# Patient Record
Sex: FEMALE | Race: WHITE | NOT HISPANIC OR LATINO | Employment: FULL TIME | URBAN - METROPOLITAN AREA
[De-identification: names, ages, dates, MRNs, and addresses within clinical notes are randomized per-mention and may not be internally consistent; named-entity substitution may affect disease eponyms.]

---

## 2023-08-25 ENCOUNTER — LAB (OUTPATIENT)
Dept: LAB | Facility: LAB | Age: 43
End: 2023-08-25
Payer: COMMERCIAL

## 2023-08-26 LAB — URINE CULTURE: NORMAL

## 2023-08-31 LAB — URINE CULTURE: NORMAL

## 2023-10-02 PROBLEM — D50.9 ANEMIA, IRON DEFICIENCY: Status: ACTIVE | Noted: 2023-10-02

## 2023-10-02 PROBLEM — B37.31 VAGINAL YEAST INFECTION: Status: ACTIVE | Noted: 2023-10-02

## 2023-10-02 PROBLEM — R09.81 NASAL CONGESTION: Status: ACTIVE | Noted: 2023-10-02

## 2023-10-02 PROBLEM — N89.8 VAGINAL IRRITATION: Status: ACTIVE | Noted: 2023-10-02

## 2023-10-02 PROBLEM — L65.9 ALOPECIA: Status: ACTIVE | Noted: 2023-10-02

## 2023-10-02 PROBLEM — M62.838 MUSCLE SPASM: Status: ACTIVE | Noted: 2023-10-02

## 2023-10-02 PROBLEM — H91.92 DECREASED HEARING OF LEFT EAR: Status: ACTIVE | Noted: 2023-10-02

## 2023-10-02 PROBLEM — K46.9 ABDOMINAL HERNIA: Status: ACTIVE | Noted: 2023-10-02

## 2023-10-02 PROBLEM — L21.9 SEBORRHEIC DERMATITIS, UNSPECIFIED: Status: ACTIVE | Noted: 2022-05-05

## 2023-10-02 PROBLEM — L29.9 PRURITUS: Status: ACTIVE | Noted: 2023-10-02

## 2023-10-02 PROBLEM — L30.9 DERMATITIS, UNSPECIFIED: Status: ACTIVE | Noted: 2022-05-05

## 2023-10-02 PROBLEM — L50.9 URTICARIA: Status: ACTIVE | Noted: 2022-05-05

## 2023-10-02 PROBLEM — L65.8: Status: ACTIVE | Noted: 2023-10-02

## 2023-10-02 PROBLEM — N92.3 INTERMENSTRUAL BLEEDING: Status: ACTIVE | Noted: 2023-10-02

## 2023-10-02 PROBLEM — M79.642 PAIN OF LEFT HAND: Status: ACTIVE | Noted: 2023-10-02

## 2023-10-02 PROBLEM — B96.89 BACTERIAL VAGINOSIS: Status: ACTIVE | Noted: 2023-10-02

## 2023-10-02 PROBLEM — S02.2XXA CLOSED FRACTURE OF NASAL BONES: Status: ACTIVE | Noted: 2023-10-02

## 2023-10-02 PROBLEM — N64.4 BREAST PAIN, RIGHT: Status: ACTIVE | Noted: 2023-10-02

## 2023-10-02 PROBLEM — N76.0 BACTERIAL VAGINOSIS: Status: ACTIVE | Noted: 2023-10-02

## 2023-10-02 PROBLEM — N90.89 VULVAR IRRITATION: Status: ACTIVE | Noted: 2023-10-02

## 2023-10-02 PROBLEM — R39.89 URETHRAL PAIN: Status: ACTIVE | Noted: 2023-10-02

## 2023-10-02 PROBLEM — Z04.9 CONDITION NOT FOUND: Status: ACTIVE | Noted: 2023-10-02

## 2023-10-02 PROBLEM — R39.9 UTI SYMPTOMS: Status: ACTIVE | Noted: 2023-10-02

## 2023-10-02 PROBLEM — N94.819 VULVODYNIA: Status: ACTIVE | Noted: 2023-10-02

## 2023-10-02 PROBLEM — H01.003 BLEPHARITIS OF EYELID OF RIGHT EYE: Status: ACTIVE | Noted: 2023-10-02

## 2023-10-02 PROBLEM — J34.2 DEVIATED NASAL SEPTUM: Status: ACTIVE | Noted: 2023-10-02

## 2023-10-02 PROBLEM — R21 RASH AND OTHER NONSPECIFIC SKIN ERUPTION: Status: ACTIVE | Noted: 2022-05-05

## 2023-10-02 PROBLEM — K43.2 INCISIONAL HERNIA, WITHOUT OBSTRUCTION OR GANGRENE: Status: ACTIVE | Noted: 2023-10-02

## 2023-10-02 PROBLEM — L50.3: Status: ACTIVE | Noted: 2022-05-05

## 2023-10-02 PROBLEM — R09.82 POSTNASAL DRIP: Status: ACTIVE | Noted: 2023-10-02

## 2023-10-02 PROBLEM — L66.10 LICHEN PLANOPILARIS: Status: ACTIVE | Noted: 2022-05-05

## 2023-10-02 PROBLEM — N39.0 BACTERIAL UTI: Status: ACTIVE | Noted: 2023-10-02

## 2023-10-02 PROBLEM — N94.89 VAGINAL BURNING: Status: ACTIVE | Noted: 2023-10-02

## 2023-10-02 PROBLEM — R87.612 LOW GRADE SQUAMOUS INTRAEPITHELIAL LESION (LGSIL) ON CERVICAL PAP SMEAR: Status: ACTIVE | Noted: 2023-10-02

## 2023-10-02 PROBLEM — A49.9 BACTERIAL UTI: Status: ACTIVE | Noted: 2023-10-02

## 2023-10-02 PROBLEM — K58.9 IBS (IRRITABLE BOWEL SYNDROME): Status: ACTIVE | Noted: 2023-10-02

## 2023-10-02 PROBLEM — R10.9 ABDOMINAL PAIN: Status: ACTIVE | Noted: 2023-10-02

## 2023-10-02 PROBLEM — R10.2 PELVIC PAIN: Status: ACTIVE | Noted: 2023-10-02

## 2023-10-02 PROBLEM — N94.9 VAGINAL BURNING: Status: ACTIVE | Noted: 2023-10-02

## 2023-10-02 PROBLEM — H53.8 BLURRY VISION: Status: ACTIVE | Noted: 2023-10-02

## 2023-10-02 PROBLEM — K21.9 GERD (GASTROESOPHAGEAL REFLUX DISEASE): Status: ACTIVE | Noted: 2023-10-02

## 2023-10-02 PROBLEM — R61 NIGHT SWEATS: Status: ACTIVE | Noted: 2023-10-02

## 2023-10-02 PROBLEM — R30.0 DYSURIA: Status: ACTIVE | Noted: 2023-10-02

## 2023-10-02 PROBLEM — H01.006 BLEPHARITIS OF EYELID OF LEFT EYE: Status: ACTIVE | Noted: 2023-10-02

## 2023-10-02 PROBLEM — R14.0 ABDOMINAL BLOATING: Status: ACTIVE | Noted: 2023-10-02

## 2023-10-02 PROBLEM — D72.10 MARKED EOSINOPHILIA: Status: ACTIVE | Noted: 2023-10-02

## 2023-10-02 PROBLEM — R00.2 PALPITATIONS: Status: ACTIVE | Noted: 2023-10-02

## 2023-10-02 PROBLEM — N94.89 VULVAR BURNING: Status: ACTIVE | Noted: 2023-10-02

## 2023-10-02 PROBLEM — D72.819 LEUKOPENIA: Status: ACTIVE | Noted: 2023-10-02

## 2023-10-02 PROBLEM — J30.9 ALLERGIC RHINITIS: Status: ACTIVE | Noted: 2023-10-02

## 2023-10-02 PROBLEM — F41.9 ANXIETY: Status: ACTIVE | Noted: 2023-10-02

## 2023-10-02 PROBLEM — L66.1 LICHEN PLANOPILARIS: Status: ACTIVE | Noted: 2022-05-05

## 2023-10-02 PROBLEM — N94.10 DYSPAREUNIA, FEMALE: Status: ACTIVE | Noted: 2023-10-02

## 2023-10-02 PROBLEM — B37.31 CANDIDIASIS OF VAGINA: Status: ACTIVE | Noted: 2023-10-02

## 2023-10-02 PROBLEM — R39.15 URINARY URGENCY: Status: ACTIVE | Noted: 2023-10-02

## 2023-10-02 PROBLEM — J34.3 HYPERTROPHY OF BOTH INFERIOR NASAL TURBINATES: Status: ACTIVE | Noted: 2023-10-02

## 2023-10-02 RX ORDER — ESTRADIOL 0.5 MG/1
TABLET ORAL
COMMUNITY
Start: 2022-11-29 | End: 2023-10-29 | Stop reason: ALTCHOICE

## 2023-10-02 RX ORDER — ONDANSETRON HYDROCHLORIDE 8 MG/1
8 TABLET, FILM COATED ORAL EVERY 8 HOURS PRN
COMMUNITY
Start: 2022-10-10 | End: 2023-10-29 | Stop reason: ALTCHOICE

## 2023-10-02 RX ORDER — ESTRADIOL MICRONIZED 100 %
POWDER (GRAM) MISCELLANEOUS
COMMUNITY
Start: 2023-05-01

## 2023-10-02 RX ORDER — MULTIVIT-MIN/IRON/FOLIC ACID/K 18-600-40
2 CAPSULE ORAL DAILY
COMMUNITY

## 2023-10-02 RX ORDER — OLOPATADINE HYDROCHLORIDE 1 MG/ML
1 SOLUTION/ DROPS OPHTHALMIC 2 TIMES DAILY
COMMUNITY
Start: 2019-08-15

## 2023-10-02 RX ORDER — BIOTIN 10 MG
1 TABLET ORAL DAILY
COMMUNITY
End: 2023-12-11 | Stop reason: ALTCHOICE

## 2023-10-02 RX ORDER — TACROLIMUS 1 MG/G
1 OINTMENT TOPICAL
COMMUNITY
Start: 2017-05-04 | End: 2023-10-29 | Stop reason: ALTCHOICE

## 2023-10-02 RX ORDER — FLUCONAZOLE 150 MG/1
TABLET ORAL
COMMUNITY
Start: 2022-10-11 | End: 2023-10-29 | Stop reason: ALTCHOICE

## 2023-10-02 RX ORDER — VEHICLE BASE NO.24
CREAM (GRAM) MISCELLANEOUS
COMMUNITY
Start: 2023-03-14 | End: 2023-10-29 | Stop reason: ALTCHOICE

## 2023-10-02 RX ORDER — DOXYCYCLINE HYCLATE 100 MG
100 TABLET ORAL 2 TIMES DAILY
COMMUNITY
Start: 2022-10-10 | End: 2023-12-11 | Stop reason: ALTCHOICE

## 2023-10-02 RX ORDER — FLUOCINONIDE 0.5 MG/G
1 CREAM TOPICAL
COMMUNITY
Start: 2020-03-24 | End: 2023-10-29 | Stop reason: ALTCHOICE

## 2023-10-04 ENCOUNTER — OFFICE VISIT (OUTPATIENT)
Dept: ALLERGY | Facility: HOSPITAL | Age: 43
End: 2023-10-04
Payer: COMMERCIAL

## 2023-10-04 DIAGNOSIS — L50.8 RECURRENT URTICARIA: Primary | ICD-10-CM

## 2023-10-04 DIAGNOSIS — L50.9 URTICARIA: ICD-10-CM

## 2023-10-04 PROCEDURE — 99442 PR PHYS/QHP TELEPHONE EVALUATION 11-20 MIN: CPT | Performed by: ALLERGY & IMMUNOLOGY

## 2023-10-04 RX ORDER — LEVOCETIRIZINE DIHYDROCHLORIDE 5 MG/1
5 TABLET, FILM COATED ORAL EVERY EVENING
Qty: 90 TABLET | Refills: 3 | Status: SHIPPED | OUTPATIENT
Start: 2023-10-04 | End: 2023-10-05 | Stop reason: SDUPTHER

## 2023-10-04 NOTE — PROGRESS NOTES
Subjective     Patient ID: Roselyn Pacheco is a 43 y.o. female who presents for follow up of urticaria and pruritus    HPI  Plan last visit:  continue levocetirzine daily at same dose with trial down by 50% after surgery   if 50% wean tolerated x 1 month, then discontinue levocetirizine   future penicillin testing in the office OFF of oral antihistamine x 7 days   cleared for NSAIDS from allergy standpoint, but NSAIDS can continue to flare urticaria by their mechanism of mast cell degranulation    Interval:  Hives: none  Itching: trialed off of levocetirizine and within 2 days itching, no hives   Well controlled on the medication  Meds: levocetirizine 5mg daily   Review of Systems  Pertinent positives and negatives have been assessed in the HPI.  All others systems have been reviewed and are negative for complaint.      Objective   Physical Exam  No physical exam this was a phone encounter      Assessment/Plan   Problem List Items Addressed This Visit       Urticaria - Primary   This visit was completed via telephone.  All issues as below were discussed and addressed but no physical exam was performed. If it was felt that the patient should be evaluated in clinic then they were directed there. The patient verbally consented to visit.  I spent 10 minutes with patient and/or family on the phone and more than 50% of the time was spent counseling and/or coordinating care.       Roselyn Pacheco is a 43 year old woman with chronic spontaneous urticaria overall well controlled on low dose levocetirizine 5 mg daily who has been in remission for years, with breakthrough pruritus that is infrequent with infection as the most common trigger. She had mild transient eosinophilia with normal AEC on repeat associated with gastroenteritis.   Will continue levocetirizine 5 mg daily   penicillin testing pending based on rash as a child--will test if oral antihistamine wean tolerated  aspirin reaction: rash as a child, and tolerance of  NSAIDs (advil)--can avoid ASA, but qualifes for a challenge in office based on tolerance of NSAIDs  --------------------------------  historically:  previous patient of Dr. Wills autoimmune urticaria started 2019, responsive to levocetirizine 5 mg daily due for titration down to assess effect  previous labs reassuring, no evidence of IgE mediated allergies on skin testing with Dr. Wills  concominant concerns of alopecia following with dermatology completing light therapy, considering rogaine, ridged nails, and fatigue, autoimmune labs ordered by PMD all normal, MUKESH neg

## 2023-10-04 NOTE — PATIENT INSTRUCTIONS
Recommendations:    Continue levocetirizine 5 mg daily       Follow up in 1 year  It was a pleasure to see you in clinic today  Call our Nurse Line with questions: [phone]    Call our  for visit follow up [phone]

## 2023-10-05 DIAGNOSIS — L50.8 RECURRENT URTICARIA: ICD-10-CM

## 2023-10-06 ENCOUNTER — PHARMACY VISIT (OUTPATIENT)
Dept: PHARMACY | Facility: CLINIC | Age: 43
End: 2023-10-06
Payer: COMMERCIAL

## 2023-10-06 PROCEDURE — RXMED WILLOW AMBULATORY MEDICATION CHARGE

## 2023-10-06 RX ORDER — LEVOCETIRIZINE DIHYDROCHLORIDE 5 MG/1
5 TABLET, FILM COATED ORAL EVERY EVENING
Qty: 90 TABLET | Refills: 0 | Status: SHIPPED | OUTPATIENT
Start: 2023-10-06 | End: 2024-01-04 | Stop reason: SDUPTHER

## 2023-10-11 ENCOUNTER — TELEPHONE (OUTPATIENT)
Dept: ALLERGY | Facility: CLINIC | Age: 43
End: 2023-10-11

## 2023-10-11 ENCOUNTER — TELEPHONE (OUTPATIENT)
Dept: ALLERGY | Facility: HOSPITAL | Age: 43
End: 2023-10-11

## 2023-10-29 ENCOUNTER — APPOINTMENT (OUTPATIENT)
Dept: PRIMARY CARE | Facility: CLINIC | Age: 43
End: 2023-10-29
Payer: COMMERCIAL

## 2023-10-29 DIAGNOSIS — J01.90 ACUTE NON-RECURRENT SINUSITIS, UNSPECIFIED LOCATION: Primary | ICD-10-CM

## 2023-10-29 RX ORDER — AZITHROMYCIN 250 MG/1
TABLET, FILM COATED ORAL
Qty: 6 TABLET | Refills: 0 | Status: SHIPPED | OUTPATIENT
Start: 2023-10-29 | End: 2023-11-08

## 2023-10-29 RX ORDER — FLUCONAZOLE 150 MG/1
150 TABLET ORAL SEE ADMIN INSTRUCTIONS
Qty: 2 TABLET | Refills: 0 | Status: SHIPPED | OUTPATIENT
Start: 2023-10-29 | End: 2023-11-10

## 2023-10-29 RX ORDER — CLINDAMYCIN HYDROCHLORIDE 300 MG/1
CAPSULE ORAL
COMMUNITY
Start: 2023-10-17 | End: 2023-12-11 | Stop reason: ALTCHOICE

## 2023-10-29 ASSESSMENT — LIFESTYLE VARIABLES: HISTORY_OF_SMOKING: I HAVE NEVER SMOKED

## 2023-10-29 NOTE — PROGRESS NOTES
Cold 1.5 weeks ago-- 5-6 days total  Congestion was better until today it is worse  Everyone around her has been sick--  Pain and pressure in sinuses today--  No fever chills aches  Earlier today hot and cold though  No V,D  Belly upset today  No cough today or SOB or wheezing  Almost feels like it is something new now-- all in head and neck feels bad  PND and ST this am on the one side  No LAD in neck  Pain in maxillary sinuses  Appetite-minimal since cold  Fluids--good  OTC--nasal saline rinse-     New viral vs sinjsitis---zpak --doxy rips up stomach

## 2023-10-30 ENCOUNTER — PHARMACY VISIT (OUTPATIENT)
Dept: PHARMACY | Facility: CLINIC | Age: 43
End: 2023-10-30
Payer: COMMERCIAL

## 2023-10-30 PROCEDURE — RXMED WILLOW AMBULATORY MEDICATION CHARGE

## 2023-12-11 ENCOUNTER — OFFICE VISIT (OUTPATIENT)
Dept: PRIMARY CARE | Facility: CLINIC | Age: 43
End: 2023-12-11
Payer: COMMERCIAL

## 2023-12-11 VITALS
OXYGEN SATURATION: 99 % | DIASTOLIC BLOOD PRESSURE: 62 MMHG | HEART RATE: 78 BPM | BODY MASS INDEX: 20.6 KG/M2 | WEIGHT: 120 LBS | SYSTOLIC BLOOD PRESSURE: 116 MMHG | RESPIRATION RATE: 18 BRPM

## 2023-12-11 DIAGNOSIS — Z12.31 VISIT FOR SCREENING MAMMOGRAM: ICD-10-CM

## 2023-12-11 DIAGNOSIS — F41.9 ANXIETY: ICD-10-CM

## 2023-12-11 DIAGNOSIS — E55.9 VITAMIN D DEFICIENCY: ICD-10-CM

## 2023-12-11 DIAGNOSIS — Z00.00 ENCOUNTER FOR ANNUAL PHYSICAL EXAM: Primary | ICD-10-CM

## 2023-12-11 DIAGNOSIS — F41.9 ANXIETY: Primary | ICD-10-CM

## 2023-12-11 PROBLEM — M79.642 PAIN OF LEFT HAND: Status: RESOLVED | Noted: 2023-10-02 | Resolved: 2023-12-11

## 2023-12-11 PROBLEM — N94.9 VAGINAL BURNING: Status: RESOLVED | Noted: 2023-10-02 | Resolved: 2023-12-11

## 2023-12-11 PROBLEM — N89.8 VAGINAL IRRITATION: Status: RESOLVED | Noted: 2023-10-02 | Resolved: 2023-12-11

## 2023-12-11 PROBLEM — R14.0 ABDOMINAL BLOATING: Status: RESOLVED | Noted: 2023-10-02 | Resolved: 2023-12-11

## 2023-12-11 PROBLEM — N64.4 BREAST PAIN, RIGHT: Status: RESOLVED | Noted: 2023-10-02 | Resolved: 2023-12-11

## 2023-12-11 PROBLEM — R39.15 URINARY URGENCY: Status: RESOLVED | Noted: 2023-10-02 | Resolved: 2023-12-11

## 2023-12-11 PROBLEM — B96.89 BACTERIAL VAGINOSIS: Status: RESOLVED | Noted: 2023-10-02 | Resolved: 2023-12-11

## 2023-12-11 PROBLEM — R21 RASH AND OTHER NONSPECIFIC SKIN ERUPTION: Status: RESOLVED | Noted: 2022-05-05 | Resolved: 2023-12-11

## 2023-12-11 PROBLEM — K46.9 ABDOMINAL HERNIA: Status: RESOLVED | Noted: 2023-10-02 | Resolved: 2023-12-11

## 2023-12-11 PROBLEM — R39.9 UTI SYMPTOMS: Status: RESOLVED | Noted: 2023-10-02 | Resolved: 2023-12-11

## 2023-12-11 PROBLEM — L21.9 SEBORRHEIC DERMATITIS, UNSPECIFIED: Status: RESOLVED | Noted: 2022-05-05 | Resolved: 2023-12-11

## 2023-12-11 PROBLEM — B37.31 CANDIDIASIS OF VAGINA: Status: RESOLVED | Noted: 2023-10-02 | Resolved: 2023-12-11

## 2023-12-11 PROBLEM — R00.2 PALPITATIONS: Status: RESOLVED | Noted: 2023-10-02 | Resolved: 2023-12-11

## 2023-12-11 PROBLEM — L50.9 URTICARIA: Status: RESOLVED | Noted: 2022-05-05 | Resolved: 2023-12-11

## 2023-12-11 PROBLEM — N90.89 VULVAR IRRITATION: Status: RESOLVED | Noted: 2023-10-02 | Resolved: 2023-12-11

## 2023-12-11 PROBLEM — N94.10 DYSPAREUNIA, FEMALE: Status: RESOLVED | Noted: 2023-10-02 | Resolved: 2023-12-11

## 2023-12-11 PROBLEM — M62.838 MUSCLE SPASM: Status: RESOLVED | Noted: 2023-10-02 | Resolved: 2023-12-11

## 2023-12-11 PROBLEM — N76.0 BACTERIAL VAGINOSIS: Status: RESOLVED | Noted: 2023-10-02 | Resolved: 2023-12-11

## 2023-12-11 PROBLEM — L65.8: Status: RESOLVED | Noted: 2023-10-02 | Resolved: 2023-12-11

## 2023-12-11 PROBLEM — A49.9 BACTERIAL UTI: Status: RESOLVED | Noted: 2023-10-02 | Resolved: 2023-12-11

## 2023-12-11 PROBLEM — R10.9 ABDOMINAL PAIN: Status: RESOLVED | Noted: 2023-10-02 | Resolved: 2023-12-11

## 2023-12-11 PROBLEM — R87.612 LOW GRADE SQUAMOUS INTRAEPITHELIAL LESION (LGSIL) ON CERVICAL PAP SMEAR: Status: RESOLVED | Noted: 2023-10-02 | Resolved: 2023-12-11

## 2023-12-11 PROBLEM — H91.92 DECREASED HEARING OF LEFT EAR: Status: RESOLVED | Noted: 2023-10-02 | Resolved: 2023-12-11

## 2023-12-11 PROBLEM — R61 NIGHT SWEATS: Status: RESOLVED | Noted: 2023-10-02 | Resolved: 2023-12-11

## 2023-12-11 PROBLEM — N94.89 VULVAR BURNING: Status: RESOLVED | Noted: 2023-10-02 | Resolved: 2023-12-11

## 2023-12-11 PROBLEM — L50.8 RECURRENT URTICARIA: Status: RESOLVED | Noted: 2023-10-04 | Resolved: 2023-12-11

## 2023-12-11 PROBLEM — Z04.9 CONDITION NOT FOUND: Status: RESOLVED | Noted: 2023-10-02 | Resolved: 2023-12-11

## 2023-12-11 PROBLEM — L29.9 PRURITUS: Status: RESOLVED | Noted: 2023-10-02 | Resolved: 2023-12-11

## 2023-12-11 PROBLEM — H53.8 BLURRY VISION: Status: RESOLVED | Noted: 2023-10-02 | Resolved: 2023-12-11

## 2023-12-11 PROBLEM — N92.3 INTERMENSTRUAL BLEEDING: Status: RESOLVED | Noted: 2023-10-02 | Resolved: 2023-12-11

## 2023-12-11 PROBLEM — N94.89 VAGINAL BURNING: Status: RESOLVED | Noted: 2023-10-02 | Resolved: 2023-12-11

## 2023-12-11 PROBLEM — R30.0 DYSURIA: Status: RESOLVED | Noted: 2023-10-02 | Resolved: 2023-12-11

## 2023-12-11 PROBLEM — L50.3: Status: RESOLVED | Noted: 2022-05-05 | Resolved: 2023-12-11

## 2023-12-11 PROBLEM — K43.2 INCISIONAL HERNIA, WITHOUT OBSTRUCTION OR GANGRENE: Status: RESOLVED | Noted: 2023-10-02 | Resolved: 2023-12-11

## 2023-12-11 PROBLEM — N39.0 BACTERIAL UTI: Status: RESOLVED | Noted: 2023-10-02 | Resolved: 2023-12-11

## 2023-12-11 PROBLEM — R09.81 NASAL CONGESTION: Status: RESOLVED | Noted: 2023-10-02 | Resolved: 2023-12-11

## 2023-12-11 PROBLEM — S02.2XXA CLOSED FRACTURE OF NASAL BONES: Status: RESOLVED | Noted: 2023-10-02 | Resolved: 2023-12-11

## 2023-12-11 PROBLEM — R09.82 POSTNASAL DRIP: Status: RESOLVED | Noted: 2023-10-02 | Resolved: 2023-12-11

## 2023-12-11 PROBLEM — B37.31 VAGINAL YEAST INFECTION: Status: RESOLVED | Noted: 2023-10-02 | Resolved: 2023-12-11

## 2023-12-11 PROBLEM — J34.2 DEVIATED NASAL SEPTUM: Status: RESOLVED | Noted: 2023-10-02 | Resolved: 2023-12-11

## 2023-12-11 PROBLEM — R39.89 URETHRAL PAIN: Status: RESOLVED | Noted: 2023-10-02 | Resolved: 2023-12-11

## 2023-12-11 PROBLEM — R10.2 PELVIC PAIN: Status: RESOLVED | Noted: 2023-10-02 | Resolved: 2023-12-11

## 2023-12-11 PROCEDURE — 99396 PREV VISIT EST AGE 40-64: CPT | Performed by: INTERNAL MEDICINE

## 2023-12-11 PROCEDURE — 1036F TOBACCO NON-USER: CPT | Performed by: INTERNAL MEDICINE

## 2023-12-11 RX ORDER — HYDROXYZINE HYDROCHLORIDE 10 MG/1
10 TABLET, FILM COATED ORAL 2 TIMES DAILY PRN
Qty: 90 TABLET | Refills: 0 | Status: SHIPPED | OUTPATIENT
Start: 2023-12-11 | End: 2024-01-25

## 2023-12-11 RX ORDER — GLYCERIN 100 %
LIQUID (ML) MISCELLANEOUS
COMMUNITY
Start: 2023-11-16

## 2023-12-11 ASSESSMENT — ENCOUNTER SYMPTOMS
DEPRESSION: 0
OCCASIONAL FEELINGS OF UNSTEADINESS: 0
LOSS OF SENSATION IN FEET: 0

## 2023-12-12 NOTE — ASSESSMENT & PLAN NOTE
Not willing to take anxiolytic medications.  Advised to see psychologist in consult.  Try hydroxyzine 10 mg twice a day as needed.

## 2023-12-12 NOTE — ASSESSMENT & PLAN NOTE
Received influenza vaccine this year.  Have screening mammogram.  Recommend to see gynecologist for screening Pap test.  Have fasting blood work.

## 2023-12-12 NOTE — PROGRESS NOTES
Subjective   Patient ID: Roselyn Pacheco is a 43 y.o. female who presents for Annual Exam.    Annual physical exam.  C/O anxiety, gastrointestinal issues diarrhea constipation.  Concern about coronary artery disease, her mother has high coronary calcium score atrial fibrillation.  Her last fasting lipid profile was within normal limits.         Review of Systems   Psychiatric/Behavioral:          Anxiety   All other systems reviewed and are negative.      Objective   /62 (BP Location: Right arm, Patient Position: Sitting)   Pulse 78   Resp 18   Wt 54.4 kg (120 lb)   SpO2 99%   BMI 20.60 kg/m²     Physical Exam  Constitutional:       Appearance: Normal appearance.   HENT:      Head: Normocephalic and atraumatic.      Right Ear: External ear normal.      Left Ear: External ear normal.      Mouth/Throat:      Mouth: Mucous membranes are moist.      Pharynx: Oropharynx is clear.   Neck:      Vascular: No carotid bruit.   Cardiovascular:      Rate and Rhythm: Normal rate and regular rhythm.      Heart sounds: No murmur heard.     No gallop.   Pulmonary:      Effort: Pulmonary effort is normal. No respiratory distress.      Breath sounds: No wheezing or rales.   Chest:      Comments: Breasts implants.  Abdominal:      General: Abdomen is flat.      Palpations: Abdomen is soft.      Hernia: No hernia is present.   Musculoskeletal:         General: No swelling, tenderness, deformity or signs of injury.      Cervical back: No rigidity or tenderness.      Right lower leg: No edema.   Lymphadenopathy:      Cervical: No cervical adenopathy.   Skin:     Coloration: Skin is not jaundiced or pale.      Findings: No bruising, erythema, lesion or rash.   Neurological:      General: No focal deficit present.      Mental Status: She is oriented to person, place, and time.      Motor: No weakness.      Coordination: Coordination normal.   Psychiatric:         Mood and Affect: Mood normal.         Behavior: Behavior normal.          Assessment/Plan   Problem List Items Addressed This Visit             ICD-10-CM    Anxiety F41.9     Not willing to take anxiolytic medications.  Advised to see psychologist in consult.  Try hydroxyzine 10 mg twice a day as needed.         Encounter for annual physical exam - Primary Z00.00     Received influenza vaccine this year.  Have screening mammogram.  Recommend to see gynecologist for screening Pap test.  Have fasting blood work.         Relevant Orders    CBC and Auto Differential    Comprehensive Metabolic Panel    Lipid Panel     Other Visit Diagnoses         Codes    Vitamin D deficiency     E55.9    Relevant Orders    Vitamin D 25-Hydroxy,Total (for eval of Vitamin D levels)    Visit for screening mammogram     Z12.31    Relevant Orders    BI mammo bilateral screening tomosynthesis

## 2024-01-04 DIAGNOSIS — L50.8 RECURRENT URTICARIA: ICD-10-CM

## 2024-01-05 RX ORDER — LEVOCETIRIZINE DIHYDROCHLORIDE 5 MG/1
5 TABLET, FILM COATED ORAL EVERY EVENING
Qty: 90 TABLET | Refills: 0 | Status: SHIPPED | OUTPATIENT
Start: 2024-01-05 | End: 2024-04-05 | Stop reason: SDUPTHER

## 2024-01-10 ENCOUNTER — PHARMACY VISIT (OUTPATIENT)
Dept: PHARMACY | Facility: CLINIC | Age: 44
End: 2024-01-10

## 2024-01-10 PROCEDURE — RXMED WILLOW AMBULATORY MEDICATION CHARGE

## 2024-04-04 ENCOUNTER — TELEPHONE (OUTPATIENT)
Dept: OBSTETRICS AND GYNECOLOGY | Facility: CLINIC | Age: 44
End: 2024-04-04

## 2024-04-04 ENCOUNTER — APPOINTMENT (OUTPATIENT)
Dept: PRIMARY CARE | Facility: CLINIC | Age: 44
End: 2024-04-04
Payer: COMMERCIAL

## 2024-04-04 ENCOUNTER — DOCUMENTATION (OUTPATIENT)
Dept: OBSTETRICS AND GYNECOLOGY | Facility: CLINIC | Age: 44
End: 2024-04-04

## 2024-04-04 ENCOUNTER — LAB (OUTPATIENT)
Dept: LAB | Facility: LAB | Age: 44
End: 2024-04-04
Payer: COMMERCIAL

## 2024-04-04 DIAGNOSIS — R35.0 URINE FREQUENCY: Primary | ICD-10-CM

## 2024-04-04 DIAGNOSIS — R35.0 URINE FREQUENCY: ICD-10-CM

## 2024-04-04 LAB
APPEARANCE UR: CLEAR
BILIRUB UR STRIP.AUTO-MCNC: NEGATIVE MG/DL
COLOR UR: COLORLESS
GLUCOSE UR STRIP.AUTO-MCNC: NORMAL MG/DL
KETONES UR STRIP.AUTO-MCNC: NEGATIVE MG/DL
LEUKOCYTE ESTERASE UR QL STRIP.AUTO: NEGATIVE
NITRITE UR QL STRIP.AUTO: NEGATIVE
PH UR STRIP.AUTO: 5.5 [PH]
PROT UR STRIP.AUTO-MCNC: NEGATIVE MG/DL
RBC # UR STRIP.AUTO: NEGATIVE /UL
SP GR UR STRIP.AUTO: 1
UROBILINOGEN UR STRIP.AUTO-MCNC: NORMAL MG/DL

## 2024-04-04 PROCEDURE — 81003 URINALYSIS AUTO W/O SCOPE: CPT

## 2024-04-04 PROCEDURE — 87086 URINE CULTURE/COLONY COUNT: CPT

## 2024-04-04 NOTE — TELEPHONE ENCOUNTER
Pt verified by name and .  Pt is aware Dr. Juares put in order for urinalysis and urine culture.

## 2024-04-04 NOTE — TELEPHONE ENCOUNTER
Pt verified by name and .  Pt calling for req for urinalysis.  Pt states she has had burning when urinating, urgegency, frequency since Monday.  Pt denies fever no flank pain.  Pt states she tried AZO last night.  Pt is awrae nurse will send message to Dr. Juares.  Pt's last appt with Elias Diaz 2022.  Pt's last thuy with Dr. Juares .  Pt has upcoming appt with Dr. Juares 2024.  Pt states her allergies are PCN and Aspirin.

## 2024-04-05 ENCOUNTER — TELEPHONE (OUTPATIENT)
Dept: OBSTETRICS AND GYNECOLOGY | Facility: CLINIC | Age: 44
End: 2024-04-05
Payer: COMMERCIAL

## 2024-04-05 ENCOUNTER — PHARMACY VISIT (OUTPATIENT)
Dept: PHARMACY | Facility: CLINIC | Age: 44
End: 2024-04-05
Payer: COMMERCIAL

## 2024-04-05 DIAGNOSIS — N39.0 URINARY TRACT INFECTION WITHOUT HEMATURIA, SITE UNSPECIFIED: ICD-10-CM

## 2024-04-05 DIAGNOSIS — L50.8 RECURRENT URTICARIA: ICD-10-CM

## 2024-04-05 LAB — HOLD SPECIMEN: NORMAL

## 2024-04-05 PROCEDURE — RXMED WILLOW AMBULATORY MEDICATION CHARGE

## 2024-04-05 RX ORDER — NITROFURANTOIN 25; 75 MG/1; MG/1
100 CAPSULE ORAL 2 TIMES DAILY
Qty: 10 CAPSULE | Refills: 0 | Status: SHIPPED | OUTPATIENT
Start: 2024-04-05 | End: 2024-04-10

## 2024-04-05 NOTE — TELEPHONE ENCOUNTER
Pt verified by name and .  Pt calling regarding marcrobid.  Pt is aware message has been sent to Dr. Figueroa to verify rx and add urine culture to her specimen at lab.  Pt states she toth snot want to take medication if she does not need it.  Pt is aware nurse  has not heard from Dr. Juares.  Pt states she will message Dr. Juares.

## 2024-04-05 NOTE — TELEPHONE ENCOUNTER
Left message for pt to return call:  MD Mindy Lares, RN  UA and urine culture. Start Macrobid 100 mg BID for 5 days          Nurse called  Lab  [phone] on 2024 at 0905.  Spoke to Kristi  Verified pt by name and .  Per Kristi an order for urine culture needs to be added on.  She is aware there is an order for urine culture in computer.

## 2024-04-05 NOTE — PROGRESS NOTES
Pt returned call.  Pt verified by name and .  Pt is aware nurse will send message to Dr. Juares to  order Macrobid 100 mg BID for 5 days per Dr. Juares.  Pt has no questions at this time.

## 2024-04-06 LAB — BACTERIA UR CULT: NO GROWTH

## 2024-04-08 ENCOUNTER — TELEPHONE (OUTPATIENT)
Dept: OBSTETRICS AND GYNECOLOGY | Facility: CLINIC | Age: 44
End: 2024-04-08
Payer: COMMERCIAL

## 2024-04-08 NOTE — TELEPHONE ENCOUNTER
Pt verified by name and .  Pt is aware of Dr. Juares's message:  Negative culture. No obvious source of her symptoms.   Pt has no questions at this time.

## 2024-04-10 RX ORDER — LEVOCETIRIZINE DIHYDROCHLORIDE 5 MG/1
5 TABLET, FILM COATED ORAL EVERY EVENING
Qty: 90 TABLET | Refills: 0 | Status: SHIPPED | OUTPATIENT
Start: 2024-04-10 | End: 2024-07-11

## 2024-04-11 ENCOUNTER — TELEPHONE (OUTPATIENT)
Dept: OBSTETRICS AND GYNECOLOGY | Facility: CLINIC | Age: 44
End: 2024-04-11
Payer: COMMERCIAL

## 2024-04-11 PROCEDURE — RXMED WILLOW AMBULATORY MEDICATION CHARGE

## 2024-04-12 ENCOUNTER — PHARMACY VISIT (OUTPATIENT)
Dept: PHARMACY | Facility: CLINIC | Age: 44
End: 2024-04-12
Payer: COMMERCIAL

## 2024-04-12 NOTE — TELEPHONE ENCOUNTER
Nurse called [facility]  Spoke to Wendy Lynne pt by name and .  Ordered per Elias Diaz:  0.03% estradiol with 0.1% testosterone in verabase.  Pt to apply 0.25 ml every third day.  45 ml with one refill.  Nurse send my chart message to pt that pharmacy will be reaching out to her.

## 2024-05-03 ENCOUNTER — LAB (OUTPATIENT)
Dept: LAB | Facility: LAB | Age: 44
End: 2024-05-03
Payer: COMMERCIAL

## 2024-05-03 DIAGNOSIS — Z00.00 ENCOUNTER FOR ANNUAL PHYSICAL EXAM: ICD-10-CM

## 2024-05-03 DIAGNOSIS — E55.9 VITAMIN D DEFICIENCY: ICD-10-CM

## 2024-05-03 LAB
25(OH)D3 SERPL-MCNC: 90 NG/ML (ref 30–100)
ALBUMIN SERPL BCP-MCNC: 4.3 G/DL (ref 3.4–5)
ALP SERPL-CCNC: 54 U/L (ref 33–110)
ALT SERPL W P-5'-P-CCNC: 15 U/L (ref 7–45)
ANION GAP SERPL CALC-SCNC: 13 MMOL/L (ref 10–20)
AST SERPL W P-5'-P-CCNC: 20 U/L (ref 9–39)
BASOPHILS # BLD AUTO: 0.03 X10*3/UL (ref 0–0.1)
BASOPHILS NFR BLD AUTO: 0.5 %
BILIRUB SERPL-MCNC: 0.7 MG/DL (ref 0–1.2)
BUN SERPL-MCNC: 15 MG/DL (ref 6–23)
CALCIUM SERPL-MCNC: 9 MG/DL (ref 8.6–10.6)
CHLORIDE SERPL-SCNC: 102 MMOL/L (ref 98–107)
CHOLEST SERPL-MCNC: 166 MG/DL (ref 0–199)
CHOLESTEROL/HDL RATIO: 2.7
CO2 SERPL-SCNC: 29 MMOL/L (ref 21–32)
CREAT SERPL-MCNC: 0.6 MG/DL (ref 0.5–1.05)
EGFRCR SERPLBLD CKD-EPI 2021: >90 ML/MIN/1.73M*2
EOSINOPHIL # BLD AUTO: 0.19 X10*3/UL (ref 0–0.7)
EOSINOPHIL NFR BLD AUTO: 3.4 %
ERYTHROCYTE [DISTWIDTH] IN BLOOD BY AUTOMATED COUNT: 11.7 % (ref 11.5–14.5)
GLUCOSE SERPL-MCNC: 89 MG/DL (ref 74–99)
HCT VFR BLD AUTO: 38.7 % (ref 36–46)
HDLC SERPL-MCNC: 60.7 MG/DL
HGB BLD-MCNC: 13.4 G/DL (ref 12–16)
IMM GRANULOCYTES # BLD AUTO: 0.02 X10*3/UL (ref 0–0.7)
IMM GRANULOCYTES NFR BLD AUTO: 0.4 % (ref 0–0.9)
LDLC SERPL CALC-MCNC: 96 MG/DL
LYMPHOCYTES # BLD AUTO: 1.81 X10*3/UL (ref 1.2–4.8)
LYMPHOCYTES NFR BLD AUTO: 32.6 %
MCH RBC QN AUTO: 31.5 PG (ref 26–34)
MCHC RBC AUTO-ENTMCNC: 34.6 G/DL (ref 32–36)
MCV RBC AUTO: 91 FL (ref 80–100)
MONOCYTES # BLD AUTO: 0.45 X10*3/UL (ref 0.1–1)
MONOCYTES NFR BLD AUTO: 8.1 %
NEUTROPHILS # BLD AUTO: 3.05 X10*3/UL (ref 1.2–7.7)
NEUTROPHILS NFR BLD AUTO: 55 %
NON HDL CHOLESTEROL: 105 MG/DL (ref 0–149)
NRBC BLD-RTO: 0 /100 WBCS (ref 0–0)
PLATELET # BLD AUTO: 276 X10*3/UL (ref 150–450)
POTASSIUM SERPL-SCNC: 3.8 MMOL/L (ref 3.5–5.3)
PROT SERPL-MCNC: 6.6 G/DL (ref 6.4–8.2)
RBC # BLD AUTO: 4.26 X10*6/UL (ref 4–5.2)
SODIUM SERPL-SCNC: 140 MMOL/L (ref 136–145)
TRIGL SERPL-MCNC: 48 MG/DL (ref 0–149)
VLDL: 10 MG/DL (ref 0–40)
WBC # BLD AUTO: 5.6 X10*3/UL (ref 4.4–11.3)

## 2024-05-03 PROCEDURE — 85025 COMPLETE CBC W/AUTO DIFF WBC: CPT

## 2024-05-03 PROCEDURE — 36415 COLL VENOUS BLD VENIPUNCTURE: CPT

## 2024-05-03 PROCEDURE — 82306 VITAMIN D 25 HYDROXY: CPT

## 2024-05-03 PROCEDURE — 80053 COMPREHEN METABOLIC PANEL: CPT

## 2024-05-03 PROCEDURE — 80061 LIPID PANEL: CPT

## 2024-05-20 ENCOUNTER — TELEPHONE (OUTPATIENT)
Dept: OBSTETRICS AND GYNECOLOGY | Facility: CLINIC | Age: 44
End: 2024-05-20
Payer: COMMERCIAL

## 2024-05-20 NOTE — TELEPHONE ENCOUNTER
Nurse called [facility].  Spoke to Yahaira.  Verified pt by name and .  Ordered per Elias Diaz:   0.03% estradiol with 0.1% testosterone ointment in [place] for pt to apply 0.5ml every other day to the vestibule; 12 week supply with 3 refill    Nurse send my chart message to pt pharmacy will be reaching out to her.            Elias Diaz, APRN-CNP  Mindy Nelson RN  Can you please call in to [facility]: for the treatment of vulvodynia: 0.03% estradiol with 0.1% testosterone ointment in [place] for pt to apply 0.5ml every other day to the vestibule; 12 week supply with 3 refill

## 2024-05-20 NOTE — PROGRESS NOTES
H/o vulvodynia; contacted me that she has noticed that if she applies the compounded 0.03% estradiol + 0.1% testosterone she every other day she has less irritation than if she spaces it out more. Will refill the prescription for QOD

## 2024-06-11 ENCOUNTER — APPOINTMENT (OUTPATIENT)
Dept: PRIMARY CARE | Facility: CLINIC | Age: 44
End: 2024-06-11
Payer: COMMERCIAL

## 2024-06-20 ENCOUNTER — APPOINTMENT (OUTPATIENT)
Dept: OBSTETRICS AND GYNECOLOGY | Facility: CLINIC | Age: 44
End: 2024-06-20
Payer: COMMERCIAL

## 2024-06-20 VITALS
WEIGHT: 122 LBS | SYSTOLIC BLOOD PRESSURE: 118 MMHG | DIASTOLIC BLOOD PRESSURE: 88 MMHG | BODY MASS INDEX: 20.83 KG/M2 | HEIGHT: 64 IN

## 2024-06-20 DIAGNOSIS — Z01.419 WELL WOMAN EXAM WITH ROUTINE GYNECOLOGICAL EXAM: Primary | ICD-10-CM

## 2024-06-20 DIAGNOSIS — Z12.4 PAP SMEAR FOR CERVICAL CANCER SCREENING: ICD-10-CM

## 2024-06-20 PROBLEM — K59.00 CONSTIPATION: Status: ACTIVE | Noted: 2024-06-20

## 2024-06-20 PROBLEM — H91.90 DECREASED HEARING: Status: ACTIVE | Noted: 2024-06-20

## 2024-06-20 PROBLEM — E55.9 VITAMIN D DEFICIENCY: Status: ACTIVE | Noted: 2024-06-20

## 2024-06-20 PROBLEM — G43.829 MENSTRUAL MIGRAINE: Status: ACTIVE | Noted: 2024-06-20

## 2024-06-20 PROBLEM — L65.9 NONSCARRING HAIR LOSS, UNSPECIFIED: Status: ACTIVE | Noted: 2022-05-05

## 2024-06-20 PROBLEM — Z86.39 HISTORY OF THYROID DISORDER: Status: ACTIVE | Noted: 2024-06-20

## 2024-06-20 PROBLEM — L29.9 PRURITUS: Status: ACTIVE | Noted: 2022-05-05

## 2024-06-20 PROBLEM — L21.9 SEBORRHEIC DERMATITIS, UNSPECIFIED: Status: ACTIVE | Noted: 2022-05-05

## 2024-06-20 PROCEDURE — 87624 HPV HI-RISK TYP POOLED RSLT: CPT

## 2024-06-20 PROCEDURE — 1036F TOBACCO NON-USER: CPT | Performed by: OBSTETRICS & GYNECOLOGY

## 2024-06-20 PROCEDURE — 88175 CYTOPATH C/V AUTO FLUID REDO: CPT

## 2024-06-20 PROCEDURE — 99396 PREV VISIT EST AGE 40-64: CPT | Performed by: OBSTETRICS & GYNECOLOGY

## 2024-06-20 NOTE — PROGRESS NOTES
Subjective   Patient ID: Roselyn Pacheco is a 44 y.o. female who presents for Annual Exam (Severe headaches around menstrual cycles /Last PAP= Due today //Last MAMMO= Past due //Theron JOHNSON MA II/).  44 year old patient, presents for annual gyn exam.   Pap due this year. Has mammogram order from PCP.   Contraception: TL  Has been seeing Elias Diaz for vulvodynia. More uncomfortable around her menses.   Headaches around time of her menses. Tried estrogen which didn't help. Finds iron/red meat helps, not drinking wine.   Review of Systems   All other systems reviewed and are negative.    Objective   Physical Exam  Vitals reviewed.   Constitutional:       Appearance: Normal appearance.   HENT:      Head: Normocephalic and atraumatic.      Right Ear: External ear normal.      Left Ear: External ear normal.      Nose: Nose normal.      Mouth/Throat:      Mouth: Mucous membranes are moist.   Eyes:      Extraocular Movements: Extraocular movements intact.   Neck:      Thyroid: No thyroid mass or thyromegaly.   Cardiovascular:      Rate and Rhythm: Normal rate and regular rhythm.      Heart sounds: Normal heart sounds.   Pulmonary:      Effort: Pulmonary effort is normal.      Breath sounds: Normal breath sounds.   Chest:   Breasts:     Right: Normal.      Left: Normal.      Comments: Bilateral breast implants  Abdominal:      General: Abdomen is flat. Bowel sounds are normal.      Palpations: Abdomen is soft.      Tenderness: There is no abdominal tenderness. There is no right CVA tenderness or left CVA tenderness.   Genitourinary:     General: Normal vulva.      Exam position: Lithotomy position.      Labia:         Right: No lesion.         Left: No lesion.       Urethra: No prolapse, urethral swelling or urethral lesion.      Vagina: Normal.      Cervix: Normal.      Uterus: Normal.       Adnexa: Right adnexa normal and left adnexa normal.   Musculoskeletal:         General: Normal range of motion.      Right shoulder:  Normal.      Left shoulder: Normal.      Cervical back: Normal, normal range of motion and neck supple.      Thoracic back: Normal.      Lumbar back: Normal.      Right hip: Normal.      Left hip: Normal.      Right upper leg: Normal.      Left upper leg: Normal.      Right knee: Normal.      Left knee: Normal.      Right lower leg: Normal.      Left lower leg: Normal.   Lymphadenopathy:      Upper Body:      Right upper body: No axillary adenopathy.      Left upper body: No axillary adenopathy.      Lower Body: No right inguinal adenopathy. No left inguinal adenopathy.   Skin:     General: Skin is warm and dry.   Neurological:      General: No focal deficit present.      Mental Status: She is alert and oriented to person, place, and time.      Cranial Nerves: Cranial nerves 2-12 are intact.      Motor: Motor function is intact.   Psychiatric:         Attention and Perception: Attention and perception normal.         Mood and Affect: Mood and affect normal.         Behavior: Behavior normal.         Cognition and Memory: Cognition normal.         Judgment: Judgment normal.         Assessment/Plan   Problem List Items Addressed This Visit    None  Visit Diagnoses         Codes    Well woman exam with routine gynecological exam    -  Primary Z01.419    Pap smear for cervical cancer screening     Z12.4    Relevant Orders    THINPREP PAP TEST (>30)             Shahana Juares MD 06/20/24 4:01 PM

## 2024-06-28 LAB
CYTOLOGY CMNT CVX/VAG CYTO-IMP: NORMAL
HPV HR 12 DNA GENITAL QL NAA+PROBE: NEGATIVE
HPV HR GENOTYPES PNL CVX NAA+PROBE: NEGATIVE
HPV16 DNA SPEC QL NAA+PROBE: NEGATIVE
HPV18 DNA SPEC QL NAA+PROBE: NEGATIVE
LAB AP HPV GENOTYPE QUESTION: YES
LAB AP HPV HR: NORMAL
LABORATORY COMMENT REPORT: NORMAL
PATH REPORT.TOTAL CANCER: NORMAL

## 2024-07-02 ENCOUNTER — LAB (OUTPATIENT)
Dept: LAB | Facility: LAB | Age: 44
End: 2024-07-02
Payer: COMMERCIAL

## 2024-07-02 ENCOUNTER — OFFICE VISIT (OUTPATIENT)
Dept: PRIMARY CARE | Facility: CLINIC | Age: 44
End: 2024-07-02
Payer: COMMERCIAL

## 2024-07-02 VITALS
BODY MASS INDEX: 21 KG/M2 | SYSTOLIC BLOOD PRESSURE: 110 MMHG | RESPIRATION RATE: 16 BRPM | DIASTOLIC BLOOD PRESSURE: 60 MMHG | HEART RATE: 71 BPM | WEIGHT: 122.36 LBS | TEMPERATURE: 97.3 F | OXYGEN SATURATION: 98 %

## 2024-07-02 DIAGNOSIS — R53.83 FATIGUE, UNSPECIFIED TYPE: ICD-10-CM

## 2024-07-02 DIAGNOSIS — R51.9 FREQUENT HEADACHES: ICD-10-CM

## 2024-07-02 DIAGNOSIS — R51.9 FRONTAL HEADACHE: Primary | ICD-10-CM

## 2024-07-02 PROCEDURE — 86038 ANTINUCLEAR ANTIBODIES: CPT

## 2024-07-02 PROCEDURE — 84443 ASSAY THYROID STIM HORMONE: CPT

## 2024-07-02 PROCEDURE — 36415 COLL VENOUS BLD VENIPUNCTURE: CPT

## 2024-07-02 PROCEDURE — 85652 RBC SED RATE AUTOMATED: CPT

## 2024-07-02 PROCEDURE — 82607 VITAMIN B-12: CPT

## 2024-07-02 PROCEDURE — 86140 C-REACTIVE PROTEIN: CPT

## 2024-07-02 PROCEDURE — 99213 OFFICE O/P EST LOW 20 MIN: CPT | Performed by: INTERNAL MEDICINE

## 2024-07-02 ASSESSMENT — PATIENT HEALTH QUESTIONNAIRE - PHQ9
2. FEELING DOWN, DEPRESSED OR HOPELESS: NOT AT ALL
SUM OF ALL RESPONSES TO PHQ9 QUESTIONS 1 AND 2: 0
1. LITTLE INTEREST OR PLEASURE IN DOING THINGS: NOT AT ALL

## 2024-07-02 ASSESSMENT — ENCOUNTER SYMPTOMS
OCCASIONAL FEELINGS OF UNSTEADINESS: 0
DEPRESSION: 0
LOSS OF SENSATION IN FEET: 0
NERVOUS/ANXIOUS: 1
HEADACHES: 1

## 2024-07-02 ASSESSMENT — PAIN SCALES - GENERAL: PAINLEVEL: 0-NO PAIN

## 2024-07-03 ENCOUNTER — APPOINTMENT (OUTPATIENT)
Dept: PRIMARY CARE | Facility: CLINIC | Age: 44
End: 2024-07-03
Payer: COMMERCIAL

## 2024-07-03 LAB
CRP SERPL-MCNC: <0.1 MG/DL
ERYTHROCYTE [SEDIMENTATION RATE] IN BLOOD BY WESTERGREN METHOD: 5 MM/H (ref 0–20)
TSH SERPL-ACNC: 2.45 MIU/L (ref 0.44–3.98)
VIT B12 SERPL-MCNC: 373 PG/ML (ref 211–911)

## 2024-07-03 NOTE — PROGRESS NOTES
Subjective   Patient ID: Roselyn Pacheco is a 44 y.o. female who presents for Headache.    Follow-up visit.  She comes to be seen complaining of frontal headaches which are different than her migraines headache.  Feels fullness frontal sinuses, ears plugged up sometimes.   She is concerned about MS, her mother has MS.  She has anxiety, at the last visit she was prescribed hydroxyzine but did not take it.  She has appointment with psychologist next month.  Complains of tiredness ,fatigue in the morning ,she wakes up and is not feeling right.    Headache          Review of Systems   HENT:  Positive for congestion.    Neurological:  Positive for headaches.   Psychiatric/Behavioral:  The patient is nervous/anxious.    All other systems reviewed and are negative.      Objective   /60 (BP Location: Left arm, Patient Position: Sitting)   Pulse 71   Temp 36.3 °C (97.3 °F) (Temporal)   Resp 16   Wt 55.5 kg (122 lb 5.7 oz)   LMP 06/13/2024 (Exact Date)   SpO2 98%   BMI 21.00 kg/m²     Physical Exam  Constitutional:       Appearance: Normal appearance.   HENT:      Head: Normocephalic and atraumatic.      Right Ear: External ear normal.      Left Ear: External ear normal.      Mouth/Throat:      Mouth: Mucous membranes are moist.      Pharynx: Oropharynx is clear.   Neck:      Vascular: No carotid bruit.   Cardiovascular:      Rate and Rhythm: Normal rate and regular rhythm.      Heart sounds: No murmur heard.     No gallop.   Pulmonary:      Effort: Pulmonary effort is normal. No respiratory distress.      Breath sounds: No wheezing or rales.   Abdominal:      General: Abdomen is flat.      Palpations: Abdomen is soft.      Hernia: No hernia is present.   Musculoskeletal:         General: No swelling, tenderness, deformity or signs of injury.      Cervical back: No rigidity or tenderness.      Right lower leg: No edema.   Lymphadenopathy:      Cervical: No cervical adenopathy.   Skin:     Coloration: Skin is not  jaundiced or pale.      Findings: No bruising, erythema, lesion or rash.   Neurological:      General: No focal deficit present.      Mental Status: She is oriented to person, place, and time.      Motor: No weakness.      Coordination: Coordination normal.   Psychiatric:         Mood and Affect: Mood normal.         Behavior: Behavior normal.         Assessment/Plan   Problem List Items Addressed This Visit             ICD-10-CM    Frontal headache - Primary R51.9     Have CT scan of the sinuses without contrast to rule out chronic sinusitis.         Relevant Orders    CT sinus wo IV contrast    Fatigue R53.83      Fatigue every morning, check TSH vitamin B12 CRP MUKESH.         Relevant Orders    TSH with reflex to Free T4 if abnormal    C-Reactive Protein    Vitamin B12     Other Visit Diagnoses         Codes    Frequent headaches     R51.9    Relevant Orders    Referral to Neurology    MUKESH with Reflex to MATTHEW    Sedimentation Rate

## 2024-07-04 DIAGNOSIS — L50.8 RECURRENT URTICARIA: ICD-10-CM

## 2024-07-05 LAB — ANA SER QL HEP2 SUBST: NEGATIVE

## 2024-07-08 RX ORDER — LEVOCETIRIZINE DIHYDROCHLORIDE 5 MG/1
5 TABLET, FILM COATED ORAL EVERY EVENING
Qty: 90 TABLET | Refills: 0 | Status: SHIPPED | OUTPATIENT
Start: 2024-07-08 | End: 2024-10-09

## 2024-07-10 ENCOUNTER — HOSPITAL ENCOUNTER (OUTPATIENT)
Dept: RADIOLOGY | Facility: HOSPITAL | Age: 44
Discharge: HOME | End: 2024-07-10
Payer: COMMERCIAL

## 2024-07-10 DIAGNOSIS — R51.9 FRONTAL HEADACHE: ICD-10-CM

## 2024-07-10 PROCEDURE — 70486 CT MAXILLOFACIAL W/O DYE: CPT | Performed by: RADIOLOGY

## 2024-07-10 PROCEDURE — 70486 CT MAXILLOFACIAL W/O DYE: CPT

## 2024-07-11 ENCOUNTER — PHARMACY VISIT (OUTPATIENT)
Dept: PHARMACY | Facility: CLINIC | Age: 44
End: 2024-07-11
Payer: COMMERCIAL

## 2024-07-11 PROCEDURE — RXMED WILLOW AMBULATORY MEDICATION CHARGE

## 2024-07-16 ENCOUNTER — APPOINTMENT (OUTPATIENT)
Dept: RADIOLOGY | Facility: HOSPITAL | Age: 44
End: 2024-07-16
Payer: COMMERCIAL

## 2024-07-18 ENCOUNTER — APPOINTMENT (OUTPATIENT)
Dept: PRIMARY CARE | Facility: CLINIC | Age: 44
End: 2024-07-18
Payer: COMMERCIAL

## 2024-07-25 ENCOUNTER — HOSPITAL ENCOUNTER (OUTPATIENT)
Dept: RADIOLOGY | Facility: HOSPITAL | Age: 44
Discharge: HOME | End: 2024-07-25
Payer: COMMERCIAL

## 2024-07-25 VITALS — BODY MASS INDEX: 20.83 KG/M2 | HEIGHT: 64 IN | WEIGHT: 122 LBS

## 2024-07-25 DIAGNOSIS — Z12.31 VISIT FOR SCREENING MAMMOGRAM: ICD-10-CM

## 2024-07-25 PROCEDURE — 77067 SCR MAMMO BI INCL CAD: CPT

## 2024-08-06 ENCOUNTER — TELEMEDICINE (OUTPATIENT)
Dept: BEHAVIORAL HEALTH | Facility: CLINIC | Age: 44
End: 2024-08-06
Payer: COMMERCIAL

## 2024-08-06 DIAGNOSIS — F43.10 PTSD (POST-TRAUMATIC STRESS DISORDER): Primary | ICD-10-CM

## 2024-08-06 PROCEDURE — 99443 PR PHYS/QHP TELEPHONE EVALUATION 21-30 MIN: CPT | Performed by: PSYCHOLOGIST

## 2024-08-07 PROBLEM — F43.10 PTSD (POST-TRAUMATIC STRESS DISORDER): Status: ACTIVE | Noted: 2024-08-07
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
## 2024-09-04 ENCOUNTER — OFFICE VISIT (OUTPATIENT)
Dept: ALLERGY | Facility: HOSPITAL | Age: 44
End: 2024-09-04
Payer: COMMERCIAL

## 2024-09-04 VITALS
DIASTOLIC BLOOD PRESSURE: 66 MMHG | SYSTOLIC BLOOD PRESSURE: 105 MMHG | WEIGHT: 125.4 LBS | TEMPERATURE: 98.3 F | BODY MASS INDEX: 21.52 KG/M2 | RESPIRATION RATE: 20 BRPM | HEART RATE: 62 BPM

## 2024-09-04 DIAGNOSIS — L29.9 PRURITUS: ICD-10-CM

## 2024-09-04 DIAGNOSIS — J31.0 NON-ALLERGIC RHINITIS: Primary | ICD-10-CM

## 2024-09-04 DIAGNOSIS — L50.8 CHRONIC URTICARIA: ICD-10-CM

## 2024-09-04 PROCEDURE — 99214 OFFICE O/P EST MOD 30 MIN: CPT | Performed by: ALLERGY & IMMUNOLOGY

## 2024-09-04 RX ORDER — CLINDAMYCIN HYDROCHLORIDE 300 MG/1
CAPSULE ORAL
COMMUNITY
Start: 2024-07-02

## 2024-09-04 RX ORDER — LEVOCETIRIZINE DIHYDROCHLORIDE 5 MG/1
5 TABLET, FILM COATED ORAL EVERY EVENING
Qty: 90 TABLET | Refills: 3 | Status: SHIPPED | OUTPATIENT
Start: 2024-09-04 | End: 2025-09-04

## 2024-09-04 RX ORDER — FLUTICASONE PROPIONATE 93 UG/1
1 SPRAY, METERED NASAL 2 TIMES DAILY
Qty: 16 ML | Refills: 3 | Status: SHIPPED | OUTPATIENT
Start: 2024-09-04

## 2024-09-04 NOTE — PROGRESS NOTES
Roselyn Pacheco presents for follow up evaluation today.          She provides the following history:  Last visit was Oct 2023  Plan last visit was levocetirizine 5 mg daily   She has had some nasal congestion, sinus CT normal  She has had a bit of eye itching and some lid swelling  ImmunoCAP completed in 2022 total IgE 14 and panel negative  Strawberries associated with oral itch  She has tried Flonase didn't help that much  She has tried nasal saline   She has had a day or 2 ok if misses, has tried 2.5 mg but then flares with itching  She is having symptoms of headaches, fatigue, memory, s going through the day feeling nervous and terrible vertigo in the mornings especiallyhe never feels good, vertigo, worse in am just the anxiety is difficulty concentrating helps symptoms I have placed also depression that I will call through to lisinopril.  Fasting and, difficulty with concenation, bouts of depression,   Vertigo and fatigue are the   ROS:  Pertinent positives and negatives have been assessed in the HPI.  All others systems have been reviewed and are negative for complaint.      Vital signs:  /66 (BP Location: Right arm, Patient Position: Sitting)   Pulse 62   Temp 36.8 °C (98.3 °F) (Oral)   Resp 20   Wt 56.9 kg (125 lb 6.4 oz)   BMI 21.52 kg/m²     Physical Exam:  GENERAL: Alert, oriented and in no acute distress.     HEENT: EYES: No conjunctival injection or cobblestoning. Nose: nasal turbinates mildly edematous and are not boggy deviated septum to right.  There is no mucous stranding, polyps, or blood    noted. EARS: Tympanic membranes are clear. MOUTH: moist and pink with no exudates, ulcers, or thrush. NECK: is supple, without adenopathy.  No upper airway stridor noted.       HEART: regular rate and rhythm.       LUNGS: Clear to auscultation bilaterally. No wheezing, rhonchi or rales.        ABDOMEN: Positive bowel sounds, soft, nontender, nondistended.       EXTREMITIES: No clubbing or edema.         NEURO:  Normal affect.  Gait normal.      SKIN: No rash, hives, or angioedema noted  Test scratch negative dermatographism    Impression:  1. Non-allergic rhinitis  fluticasone propionate (Xhance) 93 mcg/actuation aerosol breath activated      2. Chronic urticaria  levocetirizine (Xyzal) 5 mg tablet      3. Pruritus          Assessment and Plan:   Roselyn Pacheco is a patient with chronic spontaneous urticaria overall well controlled on low dose levocetirizine 5 mg daily who has been in remission for years, with breakthrough pruritus that is infrequent with infection as the most common trigger. She had mild transient eosinophilia with normal AEC on repeat associated with gastroenteritis.   Will continue levocetirizine 5 mg daily   Current issue: vertigo, difficult concentrating, generalized fatigue  Will start xhance to trial if JAMES more aggressive treatment will help, also previously was recommended neurology referral to assess for chronic fatigue syndromes.  penicillin testing pending based on rash as a child--will test if oral antihistamine wean tolerated  aspirin reaction: rash as a child, and tolerance of NSAIDs (advil)--can avoid ASA, but qualifes for a challenge in office based on tolerance of NSAIDs  --------------------------------  historically:  previous patient of Dr. Wills autoimmune urticaria started 2019, responsive to levocetirizine 5 mg daily due for titration down to assess effect  previous labs reassuring, no evidence of IgE mediated allergies on skin testing with Dr. Wills  concominant concerns of alopecia following with dermatology completing light therapy, considering rogaine, ridged nails, and fatigue, autoimmune labs ordered by PMD all normal, MUKESH neg

## 2024-09-04 NOTE — PATIENT INSTRUCTIONS
Start XHance ( PA is pending) 1 spray each nostril 1 x daily    Agree with neurology consultation    Glad that pruritus is controlled with Levocetirizine  Continue 5 mg daily of this medication    Follow up annually   It was a pleasure to see you in clinic today  Call our Nurse Line with questions: [phone]    Call our [place] for visit follow up [phone]

## 2024-09-10 PROCEDURE — RXMED WILLOW AMBULATORY MEDICATION CHARGE

## 2024-09-16 ENCOUNTER — APPOINTMENT (OUTPATIENT)
Dept: OBSTETRICS AND GYNECOLOGY | Facility: CLINIC | Age: 44
End: 2024-09-16
Payer: COMMERCIAL

## 2024-09-16 VITALS
HEIGHT: 64 IN | SYSTOLIC BLOOD PRESSURE: 102 MMHG | DIASTOLIC BLOOD PRESSURE: 68 MMHG | BODY MASS INDEX: 20.96 KG/M2 | WEIGHT: 122.8 LBS

## 2024-09-16 DIAGNOSIS — N95.1 PERIMENOPAUSAL: Primary | ICD-10-CM

## 2024-09-16 PROCEDURE — 1036F TOBACCO NON-USER: CPT | Performed by: NURSE PRACTITIONER

## 2024-09-16 PROCEDURE — 3008F BODY MASS INDEX DOCD: CPT | Performed by: NURSE PRACTITIONER

## 2024-09-16 PROCEDURE — 99213 OFFICE O/P EST LOW 20 MIN: CPT | Performed by: NURSE PRACTITIONER

## 2024-09-16 ASSESSMENT — ENCOUNTER SYMPTOMS
RESPIRATORY NEGATIVE: 0
EYES NEGATIVE: 0
CARDIOVASCULAR NEGATIVE: 0
NEUROLOGICAL NEGATIVE: 0
ENDOCRINE NEGATIVE: 0
CONSTITUTIONAL NEGATIVE: 0
MUSCULOSKELETAL NEGATIVE: 0
PSYCHIATRIC NEGATIVE: 0
GASTROINTESTINAL NEGATIVE: 0
ALLERGIC/IMMUNOLOGIC NEGATIVE: 0
HEMATOLOGIC/LYMPHATIC NEGATIVE: 0

## 2024-09-16 ASSESSMENT — PAIN SCALES - GENERAL: PAINLEVEL: 0-NO PAIN

## 2024-09-16 NOTE — PROGRESS NOTES
Subjective   Patient ID: Roselyn Pacheco is a 44 y.o. female who presents for Follow-up.  HPI  I last saw the patient in 2022 and diagnosed her with vulvodynia; compounded 0.03% estradiol + 0.1% testosterone    Contraception: tubal ligation  Noticing more hormonal fluctuations  Migraines prior to her menses, improved with reducing alcohol  Mood swings, depressive symptoms  Long h/o trouble sleeping  Joint pain: intermittent  Brain fog: yes  Menses: monthly, previously the pattern increased from 4 weeks down to 3 weeks  VMS: night sweats during her menses    Minimal dyspareunia; applying compounded 0.03% estradiol + 0.1% testosterone every other day and using a lubricant    No h/o DVT, PE    Review of Systems    Objective   Physical Exam  Genitourinary:     General: Normal vulva.      Comments: No evidence of vulvodynia        Assessment/Plan   Diagnoses and all orders for this visit:  Perimenopausal       Has a trip to [place] and will consider starting the MHT after she returns in November  States she is very sensitive to medications; our plan will be to start with the 0.025mg estradiol patch for 4-6 weeks and then add in the MP since she is so young and having regular and monthly periods the MP can be delayed while we look for side effects  BERHANE Melgar-CNP 09/16/24 3:03 PM

## 2024-09-17 ENCOUNTER — PHARMACY VISIT (OUTPATIENT)
Dept: PHARMACY | Facility: CLINIC | Age: 44
End: 2024-09-17
Payer: COMMERCIAL

## 2024-09-26 ENCOUNTER — APPOINTMENT (OUTPATIENT)
Dept: PRIMARY CARE | Facility: CLINIC | Age: 44
End: 2024-09-26
Payer: COMMERCIAL

## 2024-09-26 ENCOUNTER — LAB (OUTPATIENT)
Dept: LAB | Facility: LAB | Age: 44
End: 2024-09-26
Payer: COMMERCIAL

## 2024-09-26 ENCOUNTER — PHARMACY VISIT (OUTPATIENT)
Dept: PHARMACY | Facility: CLINIC | Age: 44
End: 2024-09-26
Payer: COMMERCIAL

## 2024-09-26 DIAGNOSIS — R30.0 DYSURIA: Primary | ICD-10-CM

## 2024-09-26 DIAGNOSIS — R35.0 URINARY FREQUENCY: Primary | ICD-10-CM

## 2024-09-26 DIAGNOSIS — R35.0 URINARY FREQUENCY: ICD-10-CM

## 2024-09-26 PROCEDURE — 87086 URINE CULTURE/COLONY COUNT: CPT

## 2024-09-26 PROCEDURE — RXMED WILLOW AMBULATORY MEDICATION CHARGE

## 2024-09-26 RX ORDER — NITROFURANTOIN 25; 75 MG/1; MG/1
100 CAPSULE ORAL 2 TIMES DAILY
Qty: 10 CAPSULE | Refills: 0 | Status: SHIPPED | OUTPATIENT
Start: 2024-09-26 | End: 2024-10-01

## 2024-09-27 LAB — BACTERIA UR CULT: NO GROWTH

## 2024-10-02 ENCOUNTER — APPOINTMENT (OUTPATIENT)
Dept: DERMATOLOGY | Facility: CLINIC | Age: 44
End: 2024-10-02
Payer: COMMERCIAL

## 2024-10-03 ENCOUNTER — APPOINTMENT (OUTPATIENT)
Dept: DERMATOLOGY | Facility: CLINIC | Age: 44
End: 2024-10-03
Payer: COMMERCIAL

## 2024-10-08 ENCOUNTER — OFFICE VISIT (OUTPATIENT)
Dept: OTOLARYNGOLOGY | Facility: CLINIC | Age: 44
End: 2024-10-08
Payer: COMMERCIAL

## 2024-10-08 ENCOUNTER — CLINICAL SUPPORT (OUTPATIENT)
Dept: AUDIOLOGY | Facility: CLINIC | Age: 44
End: 2024-10-08
Payer: COMMERCIAL

## 2024-10-08 VITALS — WEIGHT: 123 LBS | BODY MASS INDEX: 21.11 KG/M2

## 2024-10-08 DIAGNOSIS — H93.13 TINNITUS OF BOTH EARS: ICD-10-CM

## 2024-10-08 DIAGNOSIS — R42 DIZZINESS: ICD-10-CM

## 2024-10-08 DIAGNOSIS — H90.3 SENSORINEURAL HEARING LOSS (SNHL) OF BOTH EARS: Primary | ICD-10-CM

## 2024-10-08 DIAGNOSIS — M54.2 NECK PAIN: ICD-10-CM

## 2024-10-08 DIAGNOSIS — R42 VERTIGO: Primary | ICD-10-CM

## 2024-10-08 DIAGNOSIS — M26.609 TMJ DYSFUNCTION: ICD-10-CM

## 2024-10-08 DIAGNOSIS — M43.6 NECK STIFFNESS: ICD-10-CM

## 2024-10-08 DIAGNOSIS — H93.12 TINNITUS OF LEFT EAR: ICD-10-CM

## 2024-10-08 PROCEDURE — 92550 TYMPANOMETRY & REFLEX THRESH: CPT | Performed by: AUDIOLOGIST

## 2024-10-08 PROCEDURE — 1036F TOBACCO NON-USER: CPT | Performed by: NURSE PRACTITIONER

## 2024-10-08 PROCEDURE — 99204 OFFICE O/P NEW MOD 45 MIN: CPT | Performed by: NURSE PRACTITIONER

## 2024-10-08 PROCEDURE — 92557 COMPREHENSIVE HEARING TEST: CPT | Performed by: AUDIOLOGIST

## 2024-10-08 ASSESSMENT — PATIENT HEALTH QUESTIONNAIRE - PHQ9
SUM OF ALL RESPONSES TO PHQ9 QUESTIONS 1 AND 2: 0
2. FEELING DOWN, DEPRESSED OR HOPELESS: NOT AT ALL
1. LITTLE INTEREST OR PLEASURE IN DOING THINGS: NOT AT ALL

## 2024-10-08 NOTE — PROGRESS NOTES
Chief Complaint   Patient presents with    Hearing Loss    Tinnitus    Dizziness       HISTORY:  Roselyn Pacheco, age 44 years, was seen for audiogram in conjunction with otolaryngology appointment on 10/8/2024.  Ms. Pacheco reports bilateral tinnitus with the left ear worse than the right ear beginning one week ago.  She reports positional vertigo beginning one year ago.  She reports the short duration dizziness/vertigo with head movement.  She reports hearing loss both ears with the left ear worse than the right ear.  She states she suffers from sinus and allergy problems.      RESULTS:  Prior to testing both external auditory canals were clear and tympanic membranes visualized    Immittance and acoustic reflexes:  Immittance testing yielded TYPE A tympanograms indicating normal middle ear function both ears  Acoustic reflexes were present 500 - 4000 Hz both ears    Audiogram:  Normal hearing levels were obtained 125 - 3000 Hz with a mild sensorineural hearing loss 4000 - 8000 Hz both ears  Speech reception thresholds obtained at 10 dBHL both ears  Speech discrimination scores were 100% at 50 dBHL      IMPRESSIONS:  Normal middle ear function noted both ears  Normal acoustic reflexes noted both ears  Mild sensorineural hearing loss 4000 -8000 Hz both ears     RECOMMENDATIONS:  1.  Follow up with otolaryngology  2.  Retest hearing levels annually    time: 511 - 558

## 2024-10-08 NOTE — PROGRESS NOTES
Subjective   Patient ID: Roselyn Pacheco is a 44 y.o. female who presents for Tinnitus and Vertigo.  HPI  This patient is referred for evaluation of  episodic  vertiginous  positional dizziness. The patient is not accompanied by anyone. The approximate duration of her complaints is 1 year.    The patient describes her dizziness as positionally triggered vertigo lasting seconds.  When asked about ear pain, headache, phono-photophobia, visual or motion intolerance, sound or pressure induced symptoms, hearing loss, discharge from ear, tinnitus, aural fullness or autophony, the patient admits to frequent headaches (undiagnosed but likely history of migraine), new motion intolerance, mild visual intolerance, left hearing difficulty, left tinnitus, left vibration sensation.  She reports that the left-sided tinnitus has significantly increased in severity in the past few weeks.  When asked about a significant past otological history including history of prior ear surgery, noise exposure, exposure to ototoxic drugs or agents, and/or family history of hearing loss, the patient admits to recreational noise exposure.  She endorses chronic neck pain and stiffness.    Review of Systems  A comprehensive or 10 points review of the patient's constitutional, neurological, HEENT, pulmonary, cardiovascular and genito-urinary systems showed only those mentioned in history of present illness.    Objective   Physical Exam  Constitutional: no fever, chills, weight loss or weight gain   General appearance: Appears well, well-nourished, well groomed. No acute distress.   Communication: Normal communication   Psychiatric: Oriented to person, place and time. Normal mood and affect.   Neurologic: Cranial nerves II-XII grossly intact and symmetric bilaterally.   Head and Face:   Head: Atraumatic with no masses, lesions or scarring.   Face: Normal symmetry, no paralysis, synkinesis or facial tic. No scars or deformities.   TMJ: Left-sided  crepitus  Eyes: Conjunctiva not edematous or erythematous   Ears: External inspection of ears with no deformity, scars or masses. Bilateral EACs clear and bilateral TMs intact with no signs of effusions   Nose: External inspection of nose: No nasal lesions, lacerations or scars.   Neck: Normal appearing, symmetric, trachea midline.   Cardiovascular: Examination of peripheral vascular system shows no clubbing or cyanosis.   Respiratory: No respiratory distress increased work of breathing. Inspection of the chest with symmetric chest expansion and normal respiratory effort.   Skin: No rashes in the head or neck  Bedside occulomotor function assessment for ocular pursuits and saccades, spontaneous nystagmus was normal.  Bilateral head thrust negative  Romberg unsteady, patient swaying backward  Fukuda normal  Tandem gait normal  Sanibel-Hallpike negative bilaterally  My interpretation of the audiogram done today is normal hearing with excellent word recognition scores and normal tympanograms bilaterally.  Assessment/Plan        This patient presents for initial evaluation of chronic acquired vertigo, neck pain, neck stiffness, TMJ dysfunction, left subjective tinnitus.    Reassurance given that otologic exam and audiogram today are both normal.  The physiology of balance control was explained. The likely possible etiologies were reviewed. I believe the patient does not have a peripheral vestibular disorder. I recommended starting with physical therapy.  I believe her symptoms are most likely cervicogenic in origin which may be contributing to migraine.  She was given an order for an outside practice that also specializes in TMJ dysfunction.  If her insurance does not cover that practice, I am happy to place an order through our system.  If symptoms do not improve, I would recommend balance function testing and imaging.  Patient is in agreement with the plan.  All questions were answered to patient's satisfaction.      30  minutes was spent on this patient's visit. More than 50% of that time was spent in counseling regarding the possible etiologies, test results, treatment options and coordinating care.    This note was created using speech recognition transcription software. Despite proofreading, several typographical errors might be present that might affect the meaning of the content. Please call with any questions.        DANITZA Bergeron 10/08/24 12:28 PM

## 2024-10-21 ENCOUNTER — APPOINTMENT (OUTPATIENT)
Dept: OBSTETRICS AND GYNECOLOGY | Facility: CLINIC | Age: 44
End: 2024-10-21
Payer: COMMERCIAL

## 2024-10-26 ENCOUNTER — PATIENT MESSAGE (OUTPATIENT)
Dept: OTOLARYNGOLOGY | Facility: CLINIC | Age: 44
End: 2024-10-26
Payer: COMMERCIAL

## 2024-10-26 DIAGNOSIS — M26.609 TMJ DYSFUNCTION: ICD-10-CM

## 2024-10-26 DIAGNOSIS — H93.12 TINNITUS OF LEFT EAR: ICD-10-CM

## 2024-10-26 DIAGNOSIS — R42 VERTIGO: Primary | ICD-10-CM

## 2024-10-31 ENCOUNTER — APPOINTMENT (OUTPATIENT)
Dept: DERMATOLOGY | Facility: CLINIC | Age: 44
End: 2024-10-31
Payer: COMMERCIAL

## 2024-11-01 ENCOUNTER — HOSPITAL ENCOUNTER (OUTPATIENT)
Dept: RADIOLOGY | Facility: HOSPITAL | Age: 44
Discharge: HOME | End: 2024-11-01
Payer: COMMERCIAL

## 2024-11-01 DIAGNOSIS — H93.12 TINNITUS OF LEFT EAR: ICD-10-CM

## 2024-11-01 DIAGNOSIS — M26.609 TMJ DYSFUNCTION: ICD-10-CM

## 2024-11-01 PROCEDURE — 70491 CT SOFT TISSUE NECK W/DYE: CPT

## 2024-11-01 PROCEDURE — 2550000001 HC RX 255 CONTRASTS: Performed by: NURSE PRACTITIONER

## 2024-11-19 ENCOUNTER — APPOINTMENT (OUTPATIENT)
Dept: RADIOLOGY | Facility: HOSPITAL | Age: 44
End: 2024-11-19
Payer: COMMERCIAL

## 2024-11-25 ENCOUNTER — APPOINTMENT (OUTPATIENT)
Dept: OTOLARYNGOLOGY | Facility: CLINIC | Age: 44
End: 2024-11-25
Payer: COMMERCIAL

## 2024-11-25 ENCOUNTER — APPOINTMENT (OUTPATIENT)
Dept: AUDIOLOGY | Facility: CLINIC | Age: 44
End: 2024-11-25
Payer: COMMERCIAL

## 2024-11-26 ENCOUNTER — APPOINTMENT (OUTPATIENT)
Dept: OTOLARYNGOLOGY | Facility: CLINIC | Age: 44
End: 2024-11-26
Payer: COMMERCIAL

## 2024-11-26 ENCOUNTER — APPOINTMENT (OUTPATIENT)
Dept: AUDIOLOGY | Facility: CLINIC | Age: 44
End: 2024-11-26
Payer: COMMERCIAL

## 2024-12-02 ENCOUNTER — APPOINTMENT (OUTPATIENT)
Dept: OPHTHALMOLOGY | Facility: CLINIC | Age: 44
End: 2024-12-02
Payer: COMMERCIAL

## 2024-12-02 DIAGNOSIS — L20.84 INTRINSIC ATOPIC DERMATITIS: Primary | ICD-10-CM

## 2024-12-02 DIAGNOSIS — H00.024 HORDEOLUM INTERNUM LEFT UPPER EYELID: Primary | ICD-10-CM

## 2024-12-02 PROCEDURE — RXMED WILLOW AMBULATORY MEDICATION CHARGE

## 2024-12-02 PROCEDURE — 99202 OFFICE O/P NEW SF 15 MIN: CPT | Performed by: OPHTHALMOLOGY

## 2024-12-02 RX ORDER — TOBRAMYCIN AND DEXAMETHASONE 3; 1 MG/ML; MG/ML
2 SUSPENSION/ DROPS OPHTHALMIC 4 TIMES DAILY
Qty: 5 ML | Refills: 1 | Status: SHIPPED | OUTPATIENT
Start: 2024-12-02 | End: 2024-12-12

## 2024-12-02 RX ORDER — HYDROCORTISONE 25 MG/G
OINTMENT TOPICAL 2 TIMES DAILY
Qty: 56.7 G | Refills: 0 | Status: SHIPPED | OUTPATIENT
Start: 2024-12-02

## 2024-12-02 ASSESSMENT — CONF VISUAL FIELD
OD_SUPERIOR_TEMPORAL_RESTRICTION: 0
OS_INFERIOR_TEMPORAL_RESTRICTION: 0
OS_SUPERIOR_NASAL_RESTRICTION: 0
OD_INFERIOR_TEMPORAL_RESTRICTION: 0
OS_NORMAL: 1
OD_INFERIOR_NASAL_RESTRICTION: 0
OD_NORMAL: 1
OS_SUPERIOR_TEMPORAL_RESTRICTION: 0
OS_INFERIOR_NASAL_RESTRICTION: 0
OD_SUPERIOR_NASAL_RESTRICTION: 0

## 2024-12-02 ASSESSMENT — EXTERNAL EXAM - RIGHT EYE: OD_EXAM: NORMAL

## 2024-12-02 ASSESSMENT — EXTERNAL EXAM - LEFT EYE: OS_EXAM: NORMAL

## 2024-12-02 ASSESSMENT — TONOMETRY
IOP_METHOD: TONOPEN
OD_IOP_MMHG: PT DEFERRED
OS_IOP_MMHG: PT DEFERRED

## 2024-12-02 ASSESSMENT — VISUAL ACUITY
OS_SC: 20/20
METHOD: SNELLEN - LINEAR
OD_SC: 20/20

## 2024-12-02 ASSESSMENT — ENCOUNTER SYMPTOMS: EYES NEGATIVE: 1

## 2024-12-03 ENCOUNTER — APPOINTMENT (OUTPATIENT)
Dept: NEUROLOGY | Facility: CLINIC | Age: 44
End: 2024-12-03
Payer: COMMERCIAL

## 2024-12-11 ENCOUNTER — PHARMACY VISIT (OUTPATIENT)
Dept: PHARMACY | Facility: CLINIC | Age: 44
End: 2024-12-11
Payer: COMMERCIAL

## 2024-12-12 ENCOUNTER — HOSPITAL ENCOUNTER (OUTPATIENT)
Dept: RADIOLOGY | Facility: HOSPITAL | Age: 44
Discharge: HOME | End: 2024-12-12
Payer: COMMERCIAL

## 2024-12-12 DIAGNOSIS — R42 VERTIGO: ICD-10-CM

## 2024-12-12 DIAGNOSIS — H93.12 TINNITUS OF LEFT EAR: ICD-10-CM

## 2024-12-12 PROCEDURE — 2550000001 HC RX 255 CONTRASTS: Performed by: NURSE PRACTITIONER

## 2024-12-12 PROCEDURE — 70553 MRI BRAIN STEM W/O & W/DYE: CPT

## 2024-12-12 PROCEDURE — A9575 INJ GADOTERATE MEGLUMI 0.1ML: HCPCS | Performed by: NURSE PRACTITIONER

## 2024-12-12 RX ORDER — GADOTERATE MEGLUMINE 376.9 MG/ML
11 INJECTION INTRAVENOUS
Status: COMPLETED | OUTPATIENT
Start: 2024-12-12 | End: 2024-12-12

## 2024-12-12 RX ORDER — GADOTERATE MEGLUMINE 376.9 MG/ML
15 INJECTION INTRAVENOUS
Status: CANCELLED | OUTPATIENT
Start: 2024-12-12

## 2024-12-27 ENCOUNTER — TELEPHONE (OUTPATIENT)
Dept: ALLERGY | Facility: CLINIC | Age: 44
End: 2024-12-27
Payer: COMMERCIAL

## 2024-12-27 PROCEDURE — RXMED WILLOW AMBULATORY MEDICATION CHARGE

## 2024-12-27 NOTE — TELEPHONE ENCOUNTER
Unable to schedule a day not at [facility] since she works at  [employer]. Booked an appointment on 1/20 at [facility]. Patient is taking both the allegra and xyzal currently and still having a break though itch.

## 2024-12-30 NOTE — TELEPHONE ENCOUNTER
Spoke with patient and gave provider recommendation. Encouraged to keep office appointment to touch base about symptoms

## 2024-12-31 ENCOUNTER — PHARMACY VISIT (OUTPATIENT)
Dept: PHARMACY | Facility: CLINIC | Age: 44
End: 2024-12-31
Payer: COMMERCIAL

## 2025-01-15 ENCOUNTER — APPOINTMENT (OUTPATIENT)
Dept: BEHAVIORAL HEALTH | Facility: CLINIC | Age: 45
End: 2025-01-15
Payer: COMMERCIAL

## 2025-01-15 DIAGNOSIS — F43.10 PTSD (POST-TRAUMATIC STRESS DISORDER): Primary | ICD-10-CM

## 2025-01-15 PROCEDURE — 90837 PSYTX W PT 60 MINUTES: CPT | Performed by: PSYCHOLOGIST

## 2025-01-16 NOTE — PROGRESS NOTES
"START TIME: 4:15  END TIME: 5:10  TOTAL TIME FACE TO FACE: 55mins    Subjective   Patient ID: Roselyn Pacheco is a 44 y.o. female who presents for   Problem List Items Addressed This Visit       PTSD (post-traumatic stress disorder) - Primary   .    Virtual or Telephone Consent    An interactive audio and video telecommunication system which permits real time communications between the patient (at the originating site) and provider (at the distant site) was utilized to provide this telehealth service.   Verbal consent was requested and obtained from Roselyn Pacheco on this date, 1/15/25 for a telehealth visit.      Patient requested sensitive note documentation.     CONTENT OF SESSION:   This was a followup appointment (session 2) between provider and patient to address symptoms of trauma. Roselyn was relatively euthymic during session- affect was appropriate to content. She reported some hope and anticipatory anxiety in context of upcoming surgery to remove breast implants as she is experiencing symptoms of breast implant illness for years (e.g., hives).  Majority of session was on followup of treatment planning. Roselyn read handouts provided on trauma-focused therapies and discussed her reactions to reading. Henderson plan made to engage in Cognitive Processing Therapy at this time. Roselyn noted she finds pausing and thinking about her automatic thoughts to be helpful coping tool and wants to expand upon her skills and process impact of trauma.    Provider gave psychoeducation on the cognitive behavioral therapy with examples applied to PTSD and anxiety. Roselyn applied this model to her experiences; she identified with engaging in 'overpreparation' avoidance behaviors; cognitive pattern of catastrophizing, and physiologic tension and feeling like she \"shuts down' physically. Provider gave supportive counseling and normalized common emotional experiences.      Provider gave the Impact Statement writing exercise from CPT " and handout by Dr. Karie Zaragoza on trauma-informed mindfulness.    Objective   Social History     Substance and Sexual Activity   Alcohol Use Not Currently      Social History     Social History Narrative    Not on file        Assessment/Plan   Problem List Items Addressed This Visit       PTSD (post-traumatic stress disorder) - Primary     PLAN: Discussed mutual plan for trauma-focused therapy--cognitive processing therapy. Patient declined referral for medication management at this time.

## 2025-01-20 ENCOUNTER — OFFICE VISIT (OUTPATIENT)
Dept: ALLERGY | Facility: HOSPITAL | Age: 45
End: 2025-01-20
Payer: COMMERCIAL

## 2025-01-20 VITALS
WEIGHT: 121.8 LBS | BODY MASS INDEX: 20.79 KG/M2 | SYSTOLIC BLOOD PRESSURE: 112 MMHG | TEMPERATURE: 98 F | HEIGHT: 64 IN | DIASTOLIC BLOOD PRESSURE: 65 MMHG | HEART RATE: 68 BPM

## 2025-01-20 DIAGNOSIS — L50.8 CHRONIC URTICARIA: Primary | ICD-10-CM

## 2025-01-20 DIAGNOSIS — R21 RASH: ICD-10-CM

## 2025-01-20 DIAGNOSIS — R53.82 CHRONIC FATIGUE: ICD-10-CM

## 2025-01-20 PROCEDURE — 3008F BODY MASS INDEX DOCD: CPT | Performed by: ALLERGY & IMMUNOLOGY

## 2025-01-20 PROCEDURE — 99213 OFFICE O/P EST LOW 20 MIN: CPT | Performed by: ALLERGY & IMMUNOLOGY

## 2025-01-20 NOTE — PROGRESS NOTES
Roselyn Pacheco presents for follow up evaluation today.        Patient provides the following history:    Patient presents for a follow up to a rash on her face that was recurring.  Around her eyes the most. Itching descrbed as welts  Used some hydrocortsione last message from 12/17 references it going on for 6 weeks.  She was taking levocetirizine 7.5 mg at increased dose ( tried 10 mg and intolerant of side effects) and occaional allegra additionally   Aveeno eczema, lubiderm    Originally Flared up after  presumed Covid Nov 5th, symptoms headache, and fatigue x 7 days.  And then congestion about day 7 of illness. No fever, no N/V.  Had chills.  Never tested   Rash on face.    It is calming down. She has been doing 5 mg Levocetirizine for the last 2 weeks, HC 2.5% as needed  Rarely using now.   She had some mild eyelid swelling    She is having implant removal scheduled due to brain fog and fatigue and rashes, diarrhea and constipation and ongoing symptoms for years.  She had the implants originally 12 years ago, and then replacement product 2 years ago to see if this changed symptoms and symptoms have worsened          ROS:  Pertinent positives and negatives have been assessed in the HPI.  All others systems have been reviewed and are negative for complaint.    Vital signs:  Vitals:    01/20/25 1104   BP: 112/65   Pulse: 68   Temp: 36.7 °C (98 °F)         Physical Exam:  GENERAL: Alert, oriented and in no acute distress.     HEENT: EYES: No conjunctival injection or cobblestoning. Nose: nasal turbinates mildly edematous and are not boggy.  There is no mucous stranding, polyps, or blood    noted. EARS: Tympanic membranes are clear. MOUTH: moist and pink with no exudates, ulcers, or thrush. NECK: is supple, without adenopathy.  No upper airway stridor noted.       HEART: regular rate and rhythm.       LUNGS: Clear to auscultation bilaterally. No wheezing, rhonchi or rales.        ABDOMEN: Positive bowel sounds,  soft, nontender, nondistended.       EXTREMITIES: No clubbing or edema.        NEURO:  Normal affect.  Gait normal.      SKIN: 1 hyperpigmented papule on corner of right upper eyelid ( 3 mm) and she had a few hyperpigmented papules on forehead, no urticaria      Impression:  1. Chronic urticaria        2. Rash        3. Chronic fatigue  Referral to Rheumatology            Assessment and Plan:  Roselyn Pacheco is a patient with chronic spontaneous urticaria historically well controlled in remission for years on low dose levocetirizine 5 mg daily  but has had an increase in symptoms for about 6 weeks, November through end of December.  Following presumed (covid) viral infection (not tested but fatigue aches and chills and nasal congesion,  She had mild transient eosinophilia with normal AEC on repeat associated with gastroenteritis. During flare required 7.5 mg levocetirizine daily, and allegra 180mg daily and topical HC 2.5%.  Currently improved  Will continue levocetirizine 5 mg daily   Current issues: vertigo, ear ringing difficult concentrating, generalized fatigue, morning stiffness, previous ESR, MUKESH, ferritin negative.  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  I recommended referral to rheumatology.  penicillin testing pending based on rash as a child--will test if oral antihistamine wean tolerated  aspirin reaction: rash as a child, and tolerance of NSAIDs (advil)--can avoid ASA, but qualifes for a challenge in office based on tolerance of NSAIDs  ----------------------------------  historically:  previous patient of Dr. Wills autoimmune urticaria started 2019, responsive to levocetirizine 5 mg daily due for titration down to assess effect  previous labs reassuring, no evidence of IgE mediated allergies on skin testing with Dr. Wills  concominant concerns of alopecia following with dermatology completing light therapy, considering rogaine, ridged nails, and fatigue,  autoimmune labs ordered by PMD all normal, MUKESH neg

## 2025-01-20 NOTE — PATIENT INSTRUCTIONS
Glad you are improving back to baseline in your hive control    Continue levocetirizine 5 mg daily   Updose to 7.5 mg daily with hive flare    Add allegra 180 mg if 7.5 mg does not relieve hives    If removal of implants doesn't improve your ongoing symptoms I recommend rheumatology referral     Yearly for hive evaluation     Follow up yearly   It was a pleasure to see you in clinic today  Call our Nurse Line with questions: [phone]    Call our  for visit follow up [phone]

## 2025-02-04 ENCOUNTER — APPOINTMENT (OUTPATIENT)
Dept: PRIMARY CARE | Facility: CLINIC | Age: 45
End: 2025-02-04
Payer: COMMERCIAL

## 2025-02-12 ENCOUNTER — APPOINTMENT (OUTPATIENT)
Dept: BEHAVIORAL HEALTH | Facility: CLINIC | Age: 45
End: 2025-02-12
Payer: COMMERCIAL

## 2025-02-12 DIAGNOSIS — F41.9 ANXIETY: ICD-10-CM

## 2025-02-12 DIAGNOSIS — F43.10 PTSD (POST-TRAUMATIC STRESS DISORDER): Primary | ICD-10-CM

## 2025-02-12 PROCEDURE — 90837 PSYTX W PT 60 MINUTES: CPT | Performed by: PSYCHOLOGIST

## 2025-02-12 PROCEDURE — 1036F TOBACCO NON-USER: CPT | Performed by: PSYCHOLOGIST

## 2025-02-13 ENCOUNTER — APPOINTMENT (OUTPATIENT)
Dept: PRIMARY CARE | Facility: CLINIC | Age: 45
End: 2025-02-13
Payer: COMMERCIAL

## 2025-02-13 VITALS
SYSTOLIC BLOOD PRESSURE: 106 MMHG | DIASTOLIC BLOOD PRESSURE: 62 MMHG | HEART RATE: 77 BPM | BODY MASS INDEX: 19.94 KG/M2 | RESPIRATION RATE: 16 BRPM | OXYGEN SATURATION: 97 % | WEIGHT: 116.18 LBS

## 2025-02-13 DIAGNOSIS — E55.9 VITAMIN D DEFICIENCY: ICD-10-CM

## 2025-02-13 DIAGNOSIS — R53.83 FATIGUE, UNSPECIFIED TYPE: ICD-10-CM

## 2025-02-13 DIAGNOSIS — Z00.00 ENCOUNTER FOR ANNUAL PHYSICAL EXAM: Primary | ICD-10-CM

## 2025-02-13 PROCEDURE — 99396 PREV VISIT EST AGE 40-64: CPT | Performed by: INTERNAL MEDICINE

## 2025-02-13 PROCEDURE — 1036F TOBACCO NON-USER: CPT | Performed by: INTERNAL MEDICINE

## 2025-02-13 ASSESSMENT — PATIENT HEALTH QUESTIONNAIRE - PHQ9
SUM OF ALL RESPONSES TO PHQ9 QUESTIONS 1 AND 2: 0
1. LITTLE INTEREST OR PLEASURE IN DOING THINGS: NOT AT ALL
2. FEELING DOWN, DEPRESSED OR HOPELESS: NOT AT ALL

## 2025-02-13 ASSESSMENT — ENCOUNTER SYMPTOMS
OCCASIONAL FEELINGS OF UNSTEADINESS: 0
LOSS OF SENSATION IN FEET: 0
NUMBNESS: 1
FATIGUE: 1
DEPRESSION: 0
DIZZINESS: 1

## 2025-02-13 ASSESSMENT — PAIN SCALES - GENERAL: PAINLEVEL_OUTOF10: 3

## 2025-02-13 NOTE — ASSESSMENT & PLAN NOTE
She has had influenza vaccine last fall at work.  May consider Adacel vaccine.  Has seen gynecologist last year has had a Pap test.  Last screening mammogram July 2024.  Have fasting blood work.  Follow healthy diet exercise regularly.

## 2025-02-13 NOTE — PROGRESS NOTES
START TIME: 4:05   END TIME: 5  TOTAL TIME FACE TO FACE: 55mins    Subjective   Patient ID: Roselyn Pacheco is a 44 y.o. female who presents for   Problem List Items Addressed This Visit       Anxiety    PTSD (post-traumatic stress disorder) - Primary   .    Virtual or Telephone Consent    An interactive audio and video telecommunication system which permits real time communications between the patient (at the originating site) and provider (at the distant site) was utilized to provide this telehealth service.   Verbal consent was requested and obtained from Roselyn Pacheco on this date, 2/12/25 for a telehealth visit.      Patient requested sensitive note documentation.     CONTENT OF SESSION:   This was a followup appointment (session 3) between provider and patient to address symptoms of trauma. Roselyn was relatively euthymic during session- affect was appropriate to content. Roselyn reported overall feeling a sense of relief and some improvement in mood and physical wellbeing that she attributes to having her breast implants removed two weeks ago due to concerns of breast implant illness. Focus of session was on discussion of her completed Impact Statement writing exercise (from CPT for PTSD) to identify her common core beliefs (e.g., “I can't trust anyone”) and potential negative/unhelpful thought patterns (e.g., all or nothing thinking) impacted by trauma exposure. Provider gave psychoeducation on role of cognition in anxiety and PTSD. Roselyn also identified a coping strategy sometimes of seeking re-assurance when she worries someone else is upset with her.  Provider gave supportive counseling and normalized common emotional experiences.          Objective   Social History     Substance and Sexual Activity   Alcohol Use Not Currently      Social History     Social History Narrative    Not on file        Assessment/Plan   Problem List Items Addressed This Visit       Anxiety    PTSD (post-traumatic stress  disorder) - Primary     PLAN: Discussed mutual plan for trauma-focused therapy--cognitive processing therapy. Patient declined referral for medication management at this time.

## 2025-02-13 NOTE — PROGRESS NOTES
Subjective   Patient ID: Roselyn Pacheco is a 44 y.o. female who presents for Post-op Visit (Bilateral Implant Removal). Annual physical    Annual physical exam.  She has had breast surgery removal of breast implants 2 weeks ago.  She is concerned because she did not feel well after surgery.  Very dizzy vertigo-like symptoms weakness, tingling in her legs and arms.  She was told her blood pressure dropped during surgery and they had to give her a pressor.         Review of Systems   Constitutional:  Positive for fatigue.   Neurological:  Positive for dizziness and numbness.   All other systems reviewed and are negative.      Objective   /62 (BP Location: Left arm, Patient Position: Sitting)   Pulse 77   Resp 16   Wt 52.7 kg (116 lb 2.9 oz)   SpO2 97%   BMI 19.94 kg/m²     Physical Exam  Constitutional:       Appearance: Normal appearance.   HENT:      Head: Normocephalic and atraumatic.      Right Ear: External ear normal.      Left Ear: External ear normal.      Mouth/Throat:      Mouth: Mucous membranes are moist.      Pharynx: Oropharynx is clear.   Eyes:      Extraocular Movements: Extraocular movements intact.   Neck:      Vascular: No carotid bruit.   Cardiovascular:      Rate and Rhythm: Normal rate and regular rhythm.      Heart sounds: No murmur heard.     No gallop.   Pulmonary:      Effort: Pulmonary effort is normal. No respiratory distress.      Breath sounds: No wheezing or rales.   Abdominal:      General: Abdomen is flat.      Palpations: Abdomen is soft.      Hernia: No hernia is present.   Musculoskeletal:         General: No swelling, tenderness, deformity or signs of injury. Normal range of motion.      Cervical back: Normal range of motion. No rigidity or tenderness.      Right lower leg: No edema.   Lymphadenopathy:      Cervical: No cervical adenopathy.   Skin:     General: Skin is warm.      Coloration: Skin is not jaundiced or pale.      Findings: No bruising, erythema, lesion or  rash.      Comments: Tattoos.  Healed scars under her breasts   Neurological:      General: No focal deficit present.      Mental Status: She is oriented to person, place, and time.      Motor: No weakness.      Coordination: Coordination normal.   Psychiatric:         Mood and Affect: Mood normal.         Behavior: Behavior normal.         Assessment/Plan   Problem List Items Addressed This Visit             ICD-10-CM    Encounter for annual physical exam - Primary Z00.00     She has had influenza vaccine last fall at work.  May consider Adacel vaccine.  Has seen gynecologist last year has had a Pap test.  Last screening mammogram July 2024.  Have fasting blood work.  Follow healthy diet exercise regularly.         Relevant Orders    Follow Up In Primary Care - Health Maintenance    CBC and Auto Differential    Comprehensive Metabolic Panel    Vitamin D deficiency E55.9    Relevant Orders    Vitamin D 25-Hydroxy,Total (for eval of Vitamin D levels)    Fatigue R53.83    Relevant Orders    Iron and TIBC    TSH with reflex to Free T4 if abnormal    Magnesium

## 2025-02-14 ENCOUNTER — OFFICE VISIT (OUTPATIENT)
Dept: NEUROLOGY | Facility: CLINIC | Age: 45
End: 2025-02-14
Payer: COMMERCIAL

## 2025-02-14 VITALS
WEIGHT: 118 LBS | HEIGHT: 64 IN | HEART RATE: 86 BPM | SYSTOLIC BLOOD PRESSURE: 106 MMHG | TEMPERATURE: 97.1 F | BODY MASS INDEX: 20.14 KG/M2 | DIASTOLIC BLOOD PRESSURE: 65 MMHG

## 2025-02-14 DIAGNOSIS — G43.109 MIGRAINE WITH AURA AND WITHOUT STATUS MIGRAINOSUS, NOT INTRACTABLE: Primary | ICD-10-CM

## 2025-02-14 DIAGNOSIS — H93.A9 PULSATILE TINNITUS: ICD-10-CM

## 2025-02-14 LAB
25(OH)D3+25(OH)D2 SERPL-MCNC: 93 NG/ML (ref 30–100)
ALBUMIN SERPL-MCNC: 4.5 G/DL (ref 3.6–5.1)
ALP SERPL-CCNC: 59 U/L (ref 31–125)
ALT SERPL-CCNC: 20 U/L (ref 6–29)
ANION GAP SERPL CALCULATED.4IONS-SCNC: 11 MMOL/L (CALC) (ref 7–17)
AST SERPL-CCNC: 22 U/L (ref 10–30)
BASOPHILS # BLD AUTO: 73 CELLS/UL (ref 0–200)
BASOPHILS NFR BLD AUTO: 1.4 %
BILIRUB SERPL-MCNC: 0.5 MG/DL (ref 0.2–1.2)
BUN SERPL-MCNC: 15 MG/DL (ref 7–25)
CALCIUM SERPL-MCNC: 9.5 MG/DL (ref 8.6–10.2)
CHLORIDE SERPL-SCNC: 101 MMOL/L (ref 98–110)
CO2 SERPL-SCNC: 27 MMOL/L (ref 20–32)
CREAT SERPL-MCNC: 0.6 MG/DL (ref 0.5–0.99)
EGFRCR SERPLBLD CKD-EPI 2021: 113 ML/MIN/1.73M2
EOSINOPHIL # BLD AUTO: 452 CELLS/UL (ref 15–500)
EOSINOPHIL NFR BLD AUTO: 8.7 %
ERYTHROCYTE [DISTWIDTH] IN BLOOD BY AUTOMATED COUNT: 11.6 % (ref 11–15)
GLUCOSE SERPL-MCNC: 91 MG/DL (ref 65–99)
HCT VFR BLD AUTO: 40.9 % (ref 35–45)
HGB BLD-MCNC: 13.7 G/DL (ref 11.7–15.5)
IRON SATN MFR SERPL: 25 % (CALC) (ref 16–45)
IRON SERPL-MCNC: 85 MCG/DL (ref 40–190)
LYMPHOCYTES # BLD AUTO: 1477 CELLS/UL (ref 850–3900)
LYMPHOCYTES NFR BLD AUTO: 28.4 %
MAGNESIUM SERPL-MCNC: 2.1 MG/DL (ref 1.5–2.5)
MCH RBC QN AUTO: 32.2 PG (ref 27–33)
MCHC RBC AUTO-ENTMCNC: 33.5 G/DL (ref 32–36)
MCV RBC AUTO: 96 FL (ref 80–100)
MONOCYTES # BLD AUTO: 354 CELLS/UL (ref 200–950)
MONOCYTES NFR BLD AUTO: 6.8 %
NEUTROPHILS # BLD AUTO: 2844 CELLS/UL (ref 1500–7800)
NEUTROPHILS NFR BLD AUTO: 54.7 %
PLATELET # BLD AUTO: 262 THOUSAND/UL (ref 140–400)
PMV BLD REES-ECKER: 11.5 FL (ref 7.5–12.5)
POTASSIUM SERPL-SCNC: 4.2 MMOL/L (ref 3.5–5.3)
PROT SERPL-MCNC: 6.9 G/DL (ref 6.1–8.1)
RBC # BLD AUTO: 4.26 MILLION/UL (ref 3.8–5.1)
SODIUM SERPL-SCNC: 139 MMOL/L (ref 135–146)
TIBC SERPL-MCNC: 339 MCG/DL (CALC) (ref 250–450)
TSH SERPL-ACNC: 1.3 MIU/L
WBC # BLD AUTO: 5.2 THOUSAND/UL (ref 3.8–10.8)

## 2025-02-14 PROCEDURE — 3008F BODY MASS INDEX DOCD: CPT | Performed by: PSYCHIATRY & NEUROLOGY

## 2025-02-14 PROCEDURE — 1036F TOBACCO NON-USER: CPT | Performed by: PSYCHIATRY & NEUROLOGY

## 2025-02-14 PROCEDURE — 99205 OFFICE O/P NEW HI 60 MIN: CPT | Performed by: PSYCHIATRY & NEUROLOGY

## 2025-02-14 RX ORDER — NORTRIPTYLINE HYDROCHLORIDE 25 MG/1
25 CAPSULE ORAL NIGHTLY
Qty: 60 CAPSULE | Refills: 2 | Status: SHIPPED | OUTPATIENT
Start: 2025-02-14 | End: 2026-02-14

## 2025-02-14 RX ORDER — SUMATRIPTAN SUCCINATE 50 MG/1
50 TABLET ORAL ONCE AS NEEDED
Qty: 9 TABLET | Refills: 5 | Status: SHIPPED | OUTPATIENT
Start: 2025-02-14 | End: 2026-02-14

## 2025-02-14 ASSESSMENT — PAIN SCALES - GENERAL: PAINLEVEL_OUTOF10: 0-NO PAIN

## 2025-02-14 NOTE — PATIENT INSTRUCTIONS
You have mixed headaches: probably migraine in origin.  As discussed,   try the sumatriptan 50 mg at the onset of a migraine type headache, and start the nortriptyline 25 mg at bedtime.  Schedule the CTA of the head and neck.  I will call you about the CTA results.  Continue the physical therapy and try the meclizine up to three times a day (over the counter).  Call us with a progress report in 2-4 weeks about your headaches.  Follow up in 2 months.

## 2025-02-14 NOTE — PROGRESS NOTES
"Subjective   Roselyn Pacheco is a 44 y.o. right-handed  right handed female.  HPI  This is a 44 year old woman referred by ENT for tinnitus and vertigo, last seen in 10/2024.  She had migraines for a few years, have visual aura- 15-30 minutes- moves across her visual fields, associated with blurred vision- left > right.  She has had holocephalic or unilateral headaches, 7-9, associated with nausea, photophobia.  Typically at work, takes a plain Tylenol, uses a cold pack.  When she gets home she rests.  She gets migraines about once every 2-3 months.  She has \"basic headaches\" have no visual aura, across the forehead, eyes hurt, constant achy, 4-5, partially relieved by applying her fingers with pressure above the eyes.  They can be associated with nausea if lasting more than one day.  She uses a Tylenol or cold pack partially helps.    At work, the computer screen bothers her with a headache.  FH: strong history of migraines on her mother's side.   Sleep deprivation, menstrual cycle can trigger her headaches.  CT scanner at [facility]  Standing up quickly can cause dizziness- light headed, dizziness, in the last year, it is more vertiginous, combination, and can occur body/head movements.  She has fairly continuous tinnitus- generally both ears.  She can have pulsation of the left ear- started in late September, 2024.  Krista Flower found her audiogram and physical exam to be normal, including a Beka-Hallpike manuever, 10/8/24.  MRI of the brain, without and with contrast, 12/12/24: vascular loops: left anterior inferior cerebellar artery, adjacent to the cisternal segment of the left cranial nerve 7/8 complex, and additional vascular loop adjacent to the cisternal segment of the left trigeminal nerve.  She denies postural dizziness routinely, may wait several seconds before standing up from a lying position in the morning.  She denies having autonomic testing, such as a tilt-table test in the " past.  Objective   Neurological Exam  BP today: 106/65, HR:86  Mental Status: Awake and alert. Oriented to person, place and time. Speech was fluent to history. Naming, repetition and comprehension were intact. Short term memory intact tested by word recall and attention intact tested by serial sevens.    CN: Pupils were 4/4mm to 2/2mm. VF intact to finger counting. Ocular versions were intact. Facial sensation was intact to light touch bilaterally. Facial expression was symmetric. Palate elevated symmetrically. Shoulder shrug was symmetric. Tongue protruded midline.     Motor: Normal muscle bulk and tone. Strength was 5/5 bilaterally for shoulder abduction, elbow flexion/extension,  strength, hip flexion, knee flexion/extension, ankle dorsi- and plantar flexion. There were no abnormal movements.     Sensory: Intact to light touch and temperature in all 4 extremities. Does not extinguish to double simultaneous touch.     Coordination: Finger to nose and heel to shin were intact with no dysmetria.     Reflexes: 2+ bilaterally in biceps, brachioradialis, patella, and achilles. Toes were downgoing bilaterally.    Gait: Narrow based and normal. Intact heel-raise, toe-raise and tandem gait.    Physical Exam  I personally reviewed laboratory, radiographic, and medical studies which were pertinent for nothing.    Assessment/Plan

## 2025-02-19 ENCOUNTER — APPOINTMENT (OUTPATIENT)
Dept: PRIMARY CARE | Facility: CLINIC | Age: 45
End: 2025-02-19
Payer: COMMERCIAL

## 2025-02-19 DIAGNOSIS — R79.89 HIGH SERUM LOW-DENSITY LIPOPROTEIN (LDL): Primary | ICD-10-CM

## 2025-02-20 LAB
CHOLEST SERPL-MCNC: 169 MG/DL
CHOLEST/HDLC SERPL: 2.4 (CALC)
HDLC SERPL-MCNC: 70 MG/DL
LDLC SERPL CALC-MCNC: 86 MG/DL (CALC)
NONHDLC SERPL-MCNC: 99 MG/DL (CALC)
TRIGL SERPL-MCNC: 45 MG/DL

## 2025-02-21 ENCOUNTER — APPOINTMENT (OUTPATIENT)
Dept: NEUROLOGY | Facility: HOSPITAL | Age: 45
End: 2025-02-21
Payer: COMMERCIAL

## 2025-02-24 ENCOUNTER — HOSPITAL ENCOUNTER (OUTPATIENT)
Dept: RADIOLOGY | Facility: HOSPITAL | Age: 45
Discharge: HOME | End: 2025-02-24
Payer: COMMERCIAL

## 2025-02-24 DIAGNOSIS — G43.109 MIGRAINE WITH AURA AND WITHOUT STATUS MIGRAINOSUS, NOT INTRACTABLE: ICD-10-CM

## 2025-02-24 DIAGNOSIS — H93.A9 PULSATILE TINNITUS: ICD-10-CM

## 2025-02-24 PROCEDURE — 70496 CT ANGIOGRAPHY HEAD: CPT | Performed by: RADIOLOGY

## 2025-02-24 PROCEDURE — 70498 CT ANGIOGRAPHY NECK: CPT | Performed by: RADIOLOGY

## 2025-02-24 PROCEDURE — 70496 CT ANGIOGRAPHY HEAD: CPT

## 2025-02-24 PROCEDURE — 2550000001 HC RX 255 CONTRASTS: Performed by: PSYCHIATRY & NEUROLOGY

## 2025-02-24 RX ADMIN — IOHEXOL 80 ML: 350 INJECTION, SOLUTION INTRAVENOUS at 07:40

## 2025-02-25 ENCOUNTER — TELEPHONE (OUTPATIENT)
Dept: NEUROLOGY | Facility: CLINIC | Age: 45
End: 2025-02-25
Payer: COMMERCIAL

## 2025-02-26 ENCOUNTER — APPOINTMENT (OUTPATIENT)
Dept: BEHAVIORAL HEALTH | Facility: CLINIC | Age: 45
End: 2025-02-26
Payer: COMMERCIAL

## 2025-02-27 ENCOUNTER — APPOINTMENT (OUTPATIENT)
Dept: RADIOLOGY | Facility: HOSPITAL | Age: 45
End: 2025-02-27
Payer: COMMERCIAL

## 2025-02-27 ENCOUNTER — OFFICE VISIT (OUTPATIENT)
Dept: OBSTETRICS AND GYNECOLOGY | Facility: CLINIC | Age: 45
End: 2025-02-27
Payer: COMMERCIAL

## 2025-02-27 VITALS
DIASTOLIC BLOOD PRESSURE: 62 MMHG | BODY MASS INDEX: 19.81 KG/M2 | SYSTOLIC BLOOD PRESSURE: 110 MMHG | WEIGHT: 116 LBS | HEIGHT: 64 IN

## 2025-02-27 DIAGNOSIS — N89.8 VAGINAL DISCHARGE: Primary | ICD-10-CM

## 2025-02-27 PROCEDURE — 99214 OFFICE O/P EST MOD 30 MIN: CPT | Performed by: OBSTETRICS & GYNECOLOGY

## 2025-02-27 PROCEDURE — 3008F BODY MASS INDEX DOCD: CPT | Performed by: OBSTETRICS & GYNECOLOGY

## 2025-02-27 ASSESSMENT — PATIENT HEALTH QUESTIONNAIRE - PHQ9
SUM OF ALL RESPONSES TO PHQ9 QUESTIONS 1 & 2: 0
2. FEELING DOWN, DEPRESSED OR HOPELESS: NOT AT ALL
1. LITTLE INTEREST OR PLEASURE IN DOING THINGS: NOT AT ALL

## 2025-02-27 ASSESSMENT — PAIN SCALES - GENERAL: PAINLEVEL_OUTOF10: 0-NO PAIN

## 2025-02-27 NOTE — PATIENT INSTRUCTIONS
Thanks for coming in today for follow-up.      Routine cultures were sent and results should be available in the next 24 to 48 hours.  You may call the office and select option #2 to speak with the nurse to obtain the results.      Review the information about vulvovaginal care.      Follow-up with your PCP, Surgeon and other healthcare specialist as needed.      Return the office for your annual gyn exam or as needed.
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
## 2025-02-28 DIAGNOSIS — B37.9 YEAST INFECTION: Primary | ICD-10-CM

## 2025-02-28 LAB
BV SCORE VAG QL: NORMAL
C TRACH RRNA SPEC QL NAA+PROBE: NOT DETECTED
N GONORRHOEA RRNA SPEC QL NAA+PROBE: NOT DETECTED
QUEST GC CT AMPLIFIED (ALWAYS MESSAGE): NORMAL

## 2025-02-28 RX ORDER — FLUCONAZOLE 150 MG/1
TABLET ORAL
Qty: 2 TABLET | Refills: 0 | Status: SHIPPED | OUTPATIENT
Start: 2025-02-28

## 2025-03-12 ENCOUNTER — APPOINTMENT (OUTPATIENT)
Dept: BEHAVIORAL HEALTH | Facility: CLINIC | Age: 45
End: 2025-03-12
Payer: COMMERCIAL

## 2025-03-31 ENCOUNTER — APPOINTMENT (OUTPATIENT)
Dept: PRIMARY CARE | Facility: CLINIC | Age: 45
End: 2025-03-31
Payer: COMMERCIAL

## 2025-04-02 ENCOUNTER — APPOINTMENT (OUTPATIENT)
Dept: BEHAVIORAL HEALTH | Facility: CLINIC | Age: 45
End: 2025-04-02
Payer: COMMERCIAL

## 2025-04-03 ENCOUNTER — APPOINTMENT (OUTPATIENT)
Dept: NEUROLOGY | Facility: CLINIC | Age: 45
End: 2025-04-03
Payer: COMMERCIAL

## 2025-04-25 ENCOUNTER — APPOINTMENT (OUTPATIENT)
Dept: BEHAVIORAL HEALTH | Facility: CLINIC | Age: 45
End: 2025-04-25
Payer: COMMERCIAL

## 2025-04-29 ENCOUNTER — TELEPHONE (OUTPATIENT)
Dept: OBSTETRICS AND GYNECOLOGY | Facility: CLINIC | Age: 45
End: 2025-04-29
Payer: COMMERCIAL

## 2025-05-02 ENCOUNTER — TELEPHONE (OUTPATIENT)
Dept: OBSTETRICS AND GYNECOLOGY | Facility: CLINIC | Age: 45
End: 2025-05-02
Payer: COMMERCIAL

## 2025-05-02 NOTE — TELEPHONE ENCOUNTER
Left message for pt to return call.  Pt would like appt to discuss hormones.  Nurse left message fr pt to return call and my chart message also.  Elias Diaz has no openings until July.  Pt has upcoming appt in June with Dr. Juares.

## 2025-05-07 ENCOUNTER — APPOINTMENT (OUTPATIENT)
Dept: BEHAVIORAL HEALTH | Facility: CLINIC | Age: 45
End: 2025-05-07
Payer: COMMERCIAL

## 2025-05-13 ENCOUNTER — TELEPHONE (OUTPATIENT)
Dept: OBSTETRICS AND GYNECOLOGY | Facility: CLINIC | Age: 45
End: 2025-05-13
Payer: COMMERCIAL

## 2025-05-13 NOTE — TELEPHONE ENCOUNTER
Left message for pt to return call.  Sent pt my chart message also.  Per Elias Diaz:  You can offer her June 6 at either 10am or 11:40 or June 18 at 1pm

## 2025-05-14 ENCOUNTER — TELEPHONE (OUTPATIENT)
Dept: OBSTETRICS AND GYNECOLOGY | Facility: CLINIC | Age: 45
End: 2025-05-14
Payer: COMMERCIAL

## 2025-05-14 NOTE — TELEPHONE ENCOUNTER
Pt verified by name and .  Pt can not make 6-6 or 6-18 appointment.  Pt aware nurse will send Elias Diaz a message for possible dates.

## 2025-05-19 PROCEDURE — RXMED WILLOW AMBULATORY MEDICATION CHARGE

## 2025-05-22 ENCOUNTER — PHARMACY VISIT (OUTPATIENT)
Dept: PHARMACY | Facility: CLINIC | Age: 45
End: 2025-05-22
Payer: COMMERCIAL

## 2025-05-23 ENCOUNTER — APPOINTMENT (OUTPATIENT)
Dept: BEHAVIORAL HEALTH | Facility: CLINIC | Age: 45
End: 2025-05-23
Payer: COMMERCIAL

## 2025-06-20 ENCOUNTER — APPOINTMENT (OUTPATIENT)
Dept: BEHAVIORAL HEALTH | Facility: CLINIC | Age: 45
End: 2025-06-20
Payer: COMMERCIAL

## 2025-06-24 ENCOUNTER — TELEPHONE (OUTPATIENT)
Dept: OBSTETRICS AND GYNECOLOGY | Facility: CLINIC | Age: 45
End: 2025-06-24
Payer: COMMERCIAL

## 2025-06-25 ENCOUNTER — TELEPHONE (OUTPATIENT)
Dept: OBSTETRICS AND GYNECOLOGY | Facility: CLINIC | Age: 45
End: 2025-06-25
Payer: COMMERCIAL

## 2025-06-25 DIAGNOSIS — N94.819 VULVODYNIA: Primary | ICD-10-CM

## 2025-06-25 NOTE — TELEPHONE ENCOUNTER
Nurse returned [facility] call.  Spoke to [facility] pharmacy tech  Verified pt by name and .  [facility] calling for Elias Diaz's [facility] number.  Number given.

## 2025-06-26 ENCOUNTER — APPOINTMENT (OUTPATIENT)
Dept: OBSTETRICS AND GYNECOLOGY | Facility: CLINIC | Age: 45
End: 2025-06-26
Payer: COMMERCIAL

## 2025-07-07 ENCOUNTER — APPOINTMENT (OUTPATIENT)
Dept: NEUROLOGY | Facility: CLINIC | Age: 45
End: 2025-07-07
Payer: COMMERCIAL

## 2025-08-13 PROCEDURE — RXMED WILLOW AMBULATORY MEDICATION CHARGE

## 2025-08-14 ENCOUNTER — APPOINTMENT (OUTPATIENT)
Dept: PRIMARY CARE | Facility: CLINIC | Age: 45
End: 2025-08-14
Payer: COMMERCIAL

## 2025-08-14 VITALS
OXYGEN SATURATION: 98 % | WEIGHT: 116.4 LBS | DIASTOLIC BLOOD PRESSURE: 59 MMHG | HEART RATE: 77 BPM | BODY MASS INDEX: 19.98 KG/M2 | SYSTOLIC BLOOD PRESSURE: 95 MMHG | RESPIRATION RATE: 17 BRPM

## 2025-08-14 DIAGNOSIS — R11.0 NAUSEA: ICD-10-CM

## 2025-08-14 DIAGNOSIS — E55.9 VITAMIN D DEFICIENCY: ICD-10-CM

## 2025-08-14 DIAGNOSIS — Z12.31 ENCOUNTER FOR SCREENING MAMMOGRAM FOR BREAST CANCER: ICD-10-CM

## 2025-08-14 DIAGNOSIS — R10.13 EPIGASTRIC PAIN: Primary | ICD-10-CM

## 2025-08-14 DIAGNOSIS — Z12.11 ENCOUNTER FOR SCREENING COLONOSCOPY: ICD-10-CM

## 2025-08-14 PROCEDURE — 99213 OFFICE O/P EST LOW 20 MIN: CPT | Performed by: INTERNAL MEDICINE

## 2025-08-14 PROCEDURE — 1036F TOBACCO NON-USER: CPT | Performed by: INTERNAL MEDICINE

## 2025-08-14 ASSESSMENT — PATIENT HEALTH QUESTIONNAIRE - PHQ9
1. LITTLE INTEREST OR PLEASURE IN DOING THINGS: NOT AT ALL
SUM OF ALL RESPONSES TO PHQ9 QUESTIONS 1 AND 2: 0
2. FEELING DOWN, DEPRESSED OR HOPELESS: NOT AT ALL

## 2025-08-14 ASSESSMENT — ENCOUNTER SYMPTOMS
OCCASIONAL FEELINGS OF UNSTEADINESS: 0
LOSS OF SENSATION IN FEET: 0
NAUSEA: 1
DEPRESSION: 0
ABDOMINAL PAIN: 1

## 2025-08-19 ENCOUNTER — PHARMACY VISIT (OUTPATIENT)
Dept: PHARMACY | Facility: CLINIC | Age: 45
End: 2025-08-19
Payer: COMMERCIAL

## 2025-08-20 ENCOUNTER — HOSPITAL ENCOUNTER (OUTPATIENT)
Dept: RADIOLOGY | Facility: HOSPITAL | Age: 45
Discharge: HOME | End: 2025-08-20
Payer: COMMERCIAL

## 2025-08-20 DIAGNOSIS — R10.13 EPIGASTRIC PAIN: ICD-10-CM

## 2025-08-20 LAB
25(OH)D3+25(OH)D2 SERPL-MCNC: 87 NG/ML (ref 30–100)
ALBUMIN SERPL-MCNC: 4.4 G/DL (ref 3.6–5.1)
ALP SERPL-CCNC: 58 U/L (ref 31–125)
ALT SERPL-CCNC: 17 U/L (ref 6–29)
AMYLASE SERPL-CCNC: 73 U/L (ref 21–101)
ANION GAP SERPL CALCULATED.4IONS-SCNC: 8 MMOL/L (CALC) (ref 7–17)
AST SERPL-CCNC: 22 U/L (ref 10–35)
BASOPHILS # BLD AUTO: 41 CELLS/UL (ref 0–200)
BASOPHILS NFR BLD AUTO: 0.8 %
BILIRUB SERPL-MCNC: 0.4 MG/DL (ref 0.2–1.2)
BUN SERPL-MCNC: 15 MG/DL (ref 7–25)
CALCIUM SERPL-MCNC: 9.1 MG/DL (ref 8.6–10.2)
CHLORIDE SERPL-SCNC: 102 MMOL/L (ref 98–110)
CO2 SERPL-SCNC: 29 MMOL/L (ref 20–32)
CREAT SERPL-MCNC: 0.63 MG/DL (ref 0.5–0.99)
EGFRCR SERPLBLD CKD-EPI 2021: 111 ML/MIN/1.73M2
EOSINOPHIL # BLD AUTO: 209 CELLS/UL (ref 15–500)
EOSINOPHIL NFR BLD AUTO: 4.1 %
ERYTHROCYTE [DISTWIDTH] IN BLOOD BY AUTOMATED COUNT: 12.1 % (ref 11–15)
GLUCOSE SERPL-MCNC: 100 MG/DL (ref 65–99)
HCT VFR BLD AUTO: 39.7 % (ref 35–45)
HGB BLD-MCNC: 13.1 G/DL (ref 11.7–15.5)
LIPASE SERPL-CCNC: 40 U/L (ref 7–60)
LYMPHOCYTES # BLD AUTO: 1831 CELLS/UL (ref 850–3900)
LYMPHOCYTES NFR BLD AUTO: 35.9 %
MCH RBC QN AUTO: 32.6 PG (ref 27–33)
MCHC RBC AUTO-ENTMCNC: 33 G/DL (ref 32–36)
MCV RBC AUTO: 98.8 FL (ref 80–100)
MONOCYTES # BLD AUTO: 367 CELLS/UL (ref 200–950)
MONOCYTES NFR BLD AUTO: 7.2 %
NEUTROPHILS # BLD AUTO: 2652 CELLS/UL (ref 1500–7800)
NEUTROPHILS NFR BLD AUTO: 52 %
PLATELET # BLD AUTO: 234 THOUSAND/UL (ref 140–400)
PMV BLD REES-ECKER: 11.1 FL (ref 7.5–12.5)
POTASSIUM SERPL-SCNC: 3.7 MMOL/L (ref 3.5–5.3)
PROT SERPL-MCNC: 6.8 G/DL (ref 6.1–8.1)
RBC # BLD AUTO: 4.02 MILLION/UL (ref 3.8–5.1)
SODIUM SERPL-SCNC: 139 MMOL/L (ref 135–146)
WBC # BLD AUTO: 5.1 THOUSAND/UL (ref 3.8–10.8)

## 2025-08-20 PROCEDURE — 76705 ECHO EXAM OF ABDOMEN: CPT

## 2025-08-26 ENCOUNTER — APPOINTMENT (OUTPATIENT)
Dept: OPHTHALMOLOGY | Facility: CLINIC | Age: 45
End: 2025-08-26
Payer: COMMERCIAL

## 2025-08-26 DIAGNOSIS — H52.4 PRESBYOPIA OF BOTH EYES: Primary | ICD-10-CM

## 2025-08-26 PROCEDURE — 92012 INTRM OPH EXAM EST PATIENT: CPT | Performed by: OPTOMETRIST

## 2025-08-26 PROCEDURE — 92015 DETERMINE REFRACTIVE STATE: CPT | Performed by: OPTOMETRIST

## 2025-08-26 ASSESSMENT — CONF VISUAL FIELD
OD_SUPERIOR_TEMPORAL_RESTRICTION: 0
OS_SUPERIOR_TEMPORAL_RESTRICTION: 0
OS_NORMAL: 1
OD_INFERIOR_TEMPORAL_RESTRICTION: 0
OD_INFERIOR_NASAL_RESTRICTION: 0
OS_SUPERIOR_NASAL_RESTRICTION: 0
OD_SUPERIOR_NASAL_RESTRICTION: 0
OD_NORMAL: 1
OS_INFERIOR_NASAL_RESTRICTION: 0
OS_INFERIOR_TEMPORAL_RESTRICTION: 0
METHOD: COUNTING FINGERS

## 2025-08-26 ASSESSMENT — EXTERNAL EXAM - LEFT EYE: OS_EXAM: NORMAL

## 2025-08-26 ASSESSMENT — REFRACTION_MANIFEST
OS_CYLINDER: SPHERE
OD_CYLINDER: SPHERE
OD_SPHERE: +0.25
OD_ADD: +1.25
OS_SPHERE: PLANO
OS_ADD: +1.25

## 2025-08-26 ASSESSMENT — ENCOUNTER SYMPTOMS
GASTROINTESTINAL NEGATIVE: 0
MUSCULOSKELETAL NEGATIVE: 0
RESPIRATORY NEGATIVE: 0
NEUROLOGICAL NEGATIVE: 0
EYES NEGATIVE: 0
CONSTITUTIONAL NEGATIVE: 0
ALLERGIC/IMMUNOLOGIC NEGATIVE: 0
CARDIOVASCULAR NEGATIVE: 0
HEMATOLOGIC/LYMPHATIC NEGATIVE: 0
ENDOCRINE NEGATIVE: 0
PSYCHIATRIC NEGATIVE: 0

## 2025-08-26 ASSESSMENT — VISUAL ACUITY
METHOD: SNELLEN - LINEAR
OD_SC: 20/20
OS_SC: 20/20

## 2025-08-26 ASSESSMENT — TONOMETRY
OD_IOP_MMHG: 14
IOP_METHOD: GOLDMANN APPLANATION
OS_IOP_MMHG: 14

## 2025-08-26 ASSESSMENT — SLIT LAMP EXAM - LIDS
COMMENTS: NORMAL
COMMENTS: NORMAL

## 2025-08-26 ASSESSMENT — CUP TO DISC RATIO
OD_RATIO: 0.35
OS_RATIO: 0.35

## 2025-08-26 ASSESSMENT — EXTERNAL EXAM - RIGHT EYE: OD_EXAM: NORMAL

## 2025-09-15 ENCOUNTER — APPOINTMENT (OUTPATIENT)
Dept: OTOLARYNGOLOGY | Facility: CLINIC | Age: 45
End: 2025-09-15
Payer: COMMERCIAL

## 2025-10-30 ENCOUNTER — APPOINTMENT (OUTPATIENT)
Dept: OPHTHALMOLOGY | Facility: CLINIC | Age: 45
End: 2025-10-30
Payer: COMMERCIAL

## 2025-11-10 ENCOUNTER — APPOINTMENT (OUTPATIENT)
Dept: NEUROLOGY | Facility: CLINIC | Age: 45
End: 2025-11-10
Payer: COMMERCIAL

## 2026-01-29 ENCOUNTER — APPOINTMENT (OUTPATIENT)
Dept: OBSTETRICS AND GYNECOLOGY | Facility: CLINIC | Age: 46
End: 2026-01-29
Payer: COMMERCIAL